# Patient Record
Sex: MALE | Race: WHITE | NOT HISPANIC OR LATINO | Employment: OTHER | URBAN - METROPOLITAN AREA
[De-identification: names, ages, dates, MRNs, and addresses within clinical notes are randomized per-mention and may not be internally consistent; named-entity substitution may affect disease eponyms.]

---

## 2017-05-18 ASSESSMENT — ENCOUNTER SYMPTOMS
LIGHT-HEADEDNESS: 1
BOWEL INCONTINENCE: 0
BLOATING: 1
BLOOD IN STOOL: 0
CLAUDICATION: 1
CONSTIPATION: 0
HOARSE VOICE: 0
BACK PAIN: 1
HEMATURIA: 0
MUSCLE WEAKNESS: 0
DISTURBANCES IN COORDINATION: 0
EXERCISE INTOLERANCE: 0
STIFFNESS: 1
TACHYCARDIA: 0
HEARTBURN: 1
LOSS OF CONSCIOUSNESS: 0
SEIZURES: 0
WEAKNESS: 0
FATIGUE: 1
NAUSEA: 0
DIFFICULTY URINATING: 1
DYSURIA: 0
LEG PAIN: 1
TREMORS: 0
MEMORY LOSS: 0
TASTE DISTURBANCE: 0
DIARRHEA: 0
ALTERED TEMPERATURE REGULATION: 0
SYNCOPE: 0
ORTHOPNEA: 0
HEADACHES: 0
WEIGHT LOSS: 1
INCREASED ENERGY: 1
ABDOMINAL PAIN: 1
NUMBNESS: 1
VOMITING: 0
JAUNDICE: 0
HALLUCINATIONS: 0
SPEECH CHANGE: 0
POLYPHAGIA: 0
MUSCLE CRAMPS: 1
HYPERTENSION: 0
SMELL DISTURBANCE: 0
HYPOTENSION: 0
TROUBLE SWALLOWING: 0
NIGHT SWEATS: 0
PALPITATIONS: 0
SORE THROAT: 0
MYALGIAS: 1
POOR WOUND HEALING: 0
POLYDIPSIA: 0
NAIL CHANGES: 1
RECTAL PAIN: 0
JOINT SWELLING: 0
SLEEP DISTURBANCES DUE TO BREATHING: 0
CHILLS: 0
SKIN CHANGES: 0
DECREASED APPETITE: 0
SINUS PAIN: 1
DIZZINESS: 1
FEVER: 0
RECTAL BLEEDING: 0
TINGLING: 1
WEIGHT GAIN: 0
LEG SWELLING: 0
NECK MASS: 0
NECK PAIN: 1
FLANK PAIN: 0
PARALYSIS: 0
SINUS CONGESTION: 0
ARTHRALGIAS: 1

## 2017-05-19 ENCOUNTER — RECORDS - HEALTHEAST (OUTPATIENT)
Dept: LAB | Facility: CLINIC | Age: 67
End: 2017-05-19

## 2017-05-19 LAB
CHOLEST SERPL-MCNC: 148 MG/DL
FASTING STATUS PATIENT QL REPORTED: NO
HDLC SERPL-MCNC: 59 MG/DL
LDLC SERPL CALC-MCNC: 64 MG/DL
TRIGL SERPL-MCNC: 124 MG/DL

## 2017-05-22 ENCOUNTER — RECORDS - HEALTHEAST (OUTPATIENT)
Dept: ADMINISTRATIVE | Facility: OTHER | Age: 67
End: 2017-05-22

## 2017-05-22 ENCOUNTER — RESEARCH ENCOUNTER (OUTPATIENT)
Dept: NEUROLOGY | Facility: CLINIC | Age: 67
End: 2017-05-22

## 2017-05-22 ENCOUNTER — OFFICE VISIT (OUTPATIENT)
Dept: NEUROLOGY | Facility: CLINIC | Age: 67
End: 2017-05-22

## 2017-05-22 VITALS
SYSTOLIC BLOOD PRESSURE: 121 MMHG | BODY MASS INDEX: 23.51 KG/M2 | DIASTOLIC BLOOD PRESSURE: 72 MMHG | HEIGHT: 64 IN | HEART RATE: 76 BPM | WEIGHT: 137.7 LBS

## 2017-05-22 DIAGNOSIS — G71.19 PARAMYOTONIA CONGENITA: ICD-10-CM

## 2017-05-22 DIAGNOSIS — G71.11 MYOTONIC MUSCULAR DYSTROPHY (H): ICD-10-CM

## 2017-05-22 DIAGNOSIS — G25.81 RESTLESS LEGS SYNDROME (RLS): Primary | ICD-10-CM

## 2017-05-22 LAB
IRON SATN MFR SERPL: 43 % (ref 15–46)
IRON SERPL-MCNC: 118 UG/DL (ref 35–180)
TIBC SERPL-MCNC: 276 UG/DL (ref 240–430)

## 2017-05-22 RX ORDER — GABAPENTIN 300 MG/1
600 CAPSULE ORAL 3 TIMES DAILY
Qty: 540 CAPSULE | Refills: 4 | Status: SHIPPED | OUTPATIENT
Start: 2017-05-22 | End: 2018-06-18

## 2017-05-22 ASSESSMENT — PAIN SCALES - GENERAL: PAINLEVEL: NO PAIN (0)

## 2017-05-22 NOTE — PROGRESS NOTES
Kassy noe Tamera [facility] Neuromuscular Registry    IRB # [id number]  PI: Kashif Melendrez MD, PhD  : Jessica Ernst    Patient was approached for possible participation for the above study. The current approved IRB consent form was discussed and explained to the patient.  It was discussed that involvement with the study is voluntary and refusal to participate would not involve penalty or decrease benefits at which the patient is entitled, and the subject may discontinue his/her involvement at any time without penalty or loss in benefits. We also discussed that this study does not have follow up visits or procedures. Patient was informed that an additional contact might occur if data needed was not found in patient s medical record. The patient was given time to review and ask any questions about the consent. Patient was shown contact information for PI and study staff in consent for future questions. Patient verbalized understanding of consent and study by restating the purpose, procedures, duration, risk, confidentiality of PHI, and voluntarily participation. Patient printed, signed and dated the consent and HIPAA form prior to study involvement. A copy was given to the patient for their records.     Subject Consent/HIPAA : SIGNED ON 5.22.2017

## 2017-05-22 NOTE — PATIENT INSTRUCTIONS
If you have questions or concerns following today's appointment, please contact   Maddy Olivares at [phone].    For more urgent concerns, contact the neurology department triage line at [phone] option 3.    Follow up with Dr. Decker in 1 year.  Labs today.  Medication: Gabapentin refilled for 1 year today.     We now have a  available to help patients with psychosocial needs, supportive counseling, advanced care planning, and insurance and/or disability questions. You can reach TRAVON Guzman, LICSW at [phone].

## 2017-05-22 NOTE — MR AVS SNAPSHOT
After Visit Summary   5/22/2017    Vasquez Ann    MRN: [id number]           Patient Information     Date Of Birth          1950        Visit Information        Provider Department      5/22/2017 9:20 AM Glenn Decker MD [facility] Neurology        Today's Diagnoses     Restless legs syndrome (RLS)    -  1      Care Instructions    If you have questions or concerns following today's appointment, please contact   Maddy Olivares at [phone].    For more urgent concerns, contact the neurology department triage line at [phone] option 3.    Follow up with Dr. Decker in 1 year.  Labs today.  Medication: Gabapentin refilled for 1 year today.     We now have a  available to help patients with psychosocial needs, supportive counseling, advanced care planning, and insurance and/or disability questions. You can reach TRAVON Guzman, [facility] at [phone].            Follow-ups after your visit        Your next 10 appointments already scheduled     May 22, 2017 10:00 AM CDT   LAB with [facility])    [address] [place] [address] [phone]           Patient must bring picture ID.  Patient should be prepared to give a urine specimen  Please do not eat 10-12 hours before your appointment if you are coming in fasting for labs on lipids, cholesterol, or glucose (sugar).  Pregnant women should follow their Care Team instructions. Water with medications is okay. Do not drink coffee or other fluids.   If you have concerns about taking  your medications, please ask at office or if scheduling via [facility], send a message by clicking on Secure Messaging, Message Your Care Team.            May 14, 2018  8:50 AM CDT   (Arrive by 8:35 AM)   Return Muscular Dystrophy with Glenn Decker MD   [facility] Neurology ([facility])    [address] [place] [address]  "[facility] [address] [phone]              Who to contact     Please call your clinic at [phone] to:    Ask questions about your health    Make or cancel appointments    Discuss your medicines    Learn about your test results    Speak to your doctor   If you have compliments or concerns about an experience at your clinic, or if you wish to file a complaint, please contact [facility] Patient Relations at [phone] or email us at [email or web address]         Additional Information About Your Visit        Hotelbarhart Information     [employer] gives you secure access to your electronic health record. If you see a primary care provider, you can also send messages to your care team and make appointments. If you have questions, please call your primary care clinic.  If you do not have a primary care provider, please call [phone] and they will assist you.      [employer] is an electronic gateway that provides easy, online access to your medical records. With [employer], you can request a clinic appointment, read your test results, renew a prescription or communicate with your care team.     To access your existing account, please contact your [facility] or call [phone] for assistance.        Care EveryWhere ID     This is your Care EveryWhere ID. This could be used by other organizations to access your [place] medical records  [id number]        Your Vitals Were     Pulse Height BMI (Body Mass Index)             76 1.626 m (5' 4\") 23.64 kg/m2          Blood Pressure from Last 3 Encounters:   05/22/17 121/72   11/09/15 118/82   11/10/14 121/71    Weight from Last 3 Encounters:   05/22/17 62.5 kg (137 lb 11.2 oz)   11/10/14 66.7 kg (147 lb)   09/12/14 66.7 kg (147 lb)              We Performed the Following     Iron TIBC Ferritin Panel ([facility])          Today's Medication Changes          These changes are accurate as of: 5/22/17  " 9:43 AM.  If you have any questions, ask your nurse or doctor.               These medicines have changed or have updated prescriptions.        Dose/Directions    atorvastatin 20 MG tablet   Commonly known as:  LIPITOR   This may have changed:    - when to take this  - reasons to take this   Used for:  Hyperlipidemia LDL goal < 100        Dose:  20 mg   Take 1 tablet (20 mg) by mouth daily   Quantity:  90 tablet   Refills:  4       omeprazole 20 MG CR capsule   Commonly known as:  priLOSEC   This may have changed:  how much to take   Used for:  GERD (gastroesophageal reflux disease)        Dose:  20 mg   Take 1 capsule (20 mg) by mouth 2 times daily   Quantity:  180 capsule   Refills:  4            Where to get your medicines      These medications were sent to [id number] - [place], [place] [address] [facility] [address]  [facility] [address], [place] [address]     Phone:  [phone]     gabapentin 300 MG capsule                Primary Care Provider Office Phone # Fax #    Juan José Johns [phone]       [facility] [address] [facility] [address]  [facility] [address]        Thank you!     Thank you for choosing [facility] NEUROLOGY  for your care. Our goal is always to provide you with excellent care. Hearing back from our patients is one way we can continue to improve our services. Please take a few minutes to complete the written survey that you may receive in the mail after your visit with us. Thank you!             Your Updated Medication List - Protect others around you: Learn how to safely use, store and throw away your medicines at www.disposemymeds.org.          This list is accurate as of: 5/22/17  9:43 AM.  Always use your most recent med list.                   Brand Name Dispense Instructions for use    aspirin 81 MG tablet      Take 1 tablet by mouth daily.       atorvastatin 20 MG tablet    LIPITOR    90 tablet    Take 1  tablet (20 mg) by mouth daily       clonazePAM 0.5 MG tablet    klonoPIN     Take 0.5 mg by mouth as needed 3 tablets daily (1.5 mg)       CYMBALTA PO      Take 60 mg by mouth daily       gabapentin 300 MG capsule    NEURONTIN    540 capsule    Take 2 capsules (600 mg) by mouth 3 times daily       omeprazole 20 MG CR capsule    priLOSEC    180 capsule    Take 1 capsule (20 mg) by mouth 2 times daily

## 2017-05-22 NOTE — LETTER
2017       RE: aVsquez Ann  [address]    [facility] [address]     Dear Colleague,    Thank you for referring your patient, Vasquez Ann, to the [facility] NEUROLOGY at [facility] Please see a copy of my visit note below.    May 22, 2017      Juan José Johns MD   [facility]    [address]   [place] [address]      RE: Vasquez Ann   MRN: [id number]   : 1950      Dear Dr. Johns:   I had the pleasure to see Vasquez in followup at the [facility] today.  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  His chief complaint is abdominal pain and lower extremity stiffness or cramps.  He had a repeat spigelian hernia surgery a few months ago that was painful and recovery was more prolonged than usual.  He has been extensively evaluated by GI several times and the pain is not felt to be due to a gastroenterological problem.  I felt that this could be attributed to muscle spasm of the abdominal wall possibly related to myotonia.  He takes gabapentin 600 mg t.i.d. which he finds helpful.  He is also on Cymbalta 60 mg daily.  He describes to me some restlessness of his legs in the evenings. He has to move when he sits on a chair and when he gets up and goes, the stiffness gets better.  The same symptoms are reported by both of his daughters.      CURRENT MEDICATIONS:  Reviewed and are as per Epic record.   PHYSICAL EXAMINATION:     VITAL SIGNS:   His blood pressure 121/72.  Pulse 76 and regular.  He weighs 62.5 kg.  Height is 163.  He endorses no pain. Neurologic examination demonstrates no weakness of the proximal or distal muscles in upper or lower extremities, and no weakness in neck flexion or extension.  I did not detect hand  myotonia or myotonia upon percussion of the thenar eminence or EDC today.  He does not have any eyelid myotonia or lid  lag, either.      In summary, Mr. Ann has paramyotonia congenita with an SCN4A mutation but some of the symptomatology he describes to me sound like restless legs syndrome and so do his daughters' complaints.  For that reason, I will check an iron panel.  If this is abnormal, this should be investigated by his primary care doctor with appropriate endoscopic evaluation for GI blood loss. He is satisfied with gabapentin 600 mg t.i.d., and I am going to renew it.  He will return to our clinic for followup in 1 year. TT spent for patient care 15 minutes; more than half was counseling.      Sincerely,      Glenn Decker MD

## 2017-05-22 NOTE — PROGRESS NOTES
May 22, 2017      Juan José Johns MD   [facility]    [address]   [place] [address]      RE: Vasquez Ann   MRN: [id number]   : 1950      Dear Dr. Machado:      I had the pleasure to see Vasquez in followup at the [facility] today.  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  His chief complaint is abdominal pain and lower extremity stiffness or cramps.  He had a repeat spigelian hernia surgery a few months ago that was painful and recovery was more prolonged than usual.  He has been extensively evaluated by GI several times and the pain is not felt to be due to a gastroenterological problem.  I felt that this could be attributed to muscle spasm of the abdominal wall possibly related to myotonia.  He takes gabapentin 600 mg t.i.d. which he finds helpful.  He is also on Cymbalta 60 mg daily.  He describes to me some restlessness of his legs in the evenings. He has to move when he sits on a chair and when he gets up and goes, the stiffness gets better.  The same symptoms are reported by both of his daughters.      CURRENT MEDICATIONS:  Reviewed and are as per Epic record.      PHYSICAL EXAMINATION:     VITAL SIGNS:   His blood pressure 121/72.  Pulse 76 and regular.  He weighs 62.5 kg.  Height is 163.  He endorses no pain. Neurologic examination demonstrates no weakness of the proximal or distal muscles in upper or lower extremities, and no weakness in neck flexion or extension.  I did not detect hand  myotonia or myotonia upon percussion of the thenar eminence or EDC today.  He does not have any eyelid myotonia or lid lag, either.      In summary, Mr. Ann has paramyotonia congenita with an SCN4A mutation but some of the symptomatology he describes to me sound like restless legs syndrome and so do his daughters' complaints.  For that reason, I will check an iron panel.  If this is abnormal, this should  be investigated by his primary care doctor with appropriate endoscopic evaluation for GI blood loss. He is satisfied with gabapentin 600 mg t.i.d., and I am going to renew it.  He will return to our clinic for followup in 1 year. TT spent for patient care 15 minutes; more than half was counseling.      Sincerely,       MD THAD Li MD             D: 2017 09:18   T: 2017 11:27   MT: SHANICE      Name:     LANG OLIVAS   MRN:      [id number]        Account:      [id number]   :      1950           Service Date: 2017      Document: [id number]

## 2017-05-23 NOTE — PROGRESS NOTES
Answers for HPI/ROS submitted by the patient on 5/18/2017   General Symptoms: Yes  Skin Symptoms: Yes  HENT Symptoms: Yes  EYE SYMPTOMS: No  HEART SYMPTOMS: Yes  LUNG SYMPTOMS: No  INTESTINAL SYMPTOMS: Yes  URINARY SYMPTOMS: Yes  REPRODUCTIVE SYMPTOMS: Yes  SKELETAL SYMPTOMS: Yes  BLOOD SYMPTOMS: No  NERVOUS SYSTEM SYMPTOMS: Yes  MENTAL HEALTH SYMPTOMS: No  Fever: No  Loss of appetite: No  Weight loss: Yes  Weight gain: No  Fatigue: Yes  Night sweats: No  Chills: No  Increased stress: No  Excessive hunger: No  Excessive thirst: No  Feeling hot or cold when others believe the temperature is normal: No  Loss of height: No  Post-operative complications: No  Surgical site pain: Yes  Hallucinations: No  Change in or Loss of Energy: Yes  Hyperactivity: No  Confusion: No  Changes in hair: No  Changes in moles/birth marks: No  Itching: No  Rashes: No  Changes in nails: Yes  Acne: No  Change in facial hair: No  Warts: No  Non-healing sores: No  Scarring: No  Flaking of skin: No  Color changes of hands/feet in cold : No  Sun sensitivity: No  Skin thickening: No  Ear pain: No  Ear discharge: No  Hearing loss: No  Tinnitus: No  Nosebleeds: Yes  Congestion: No  Sinus pain: Yes  Trouble swallowing: No   Voice hoarseness: No  Mouth sores: No  Sore throat: No  Tooth pain: No  Gum tenderness: No  Bleeding gums: No  Change in taste: No  Change in sense of smell: No  Dry mouth: Yes  Hearing aid used: No  Neck lump: No  Chest pain or pressure: No  Fast or irregular heartbeat: No  Pain in legs with walking: Yes  Swelling in feet or ankles: No  Trouble breathing while lying down: No  Fingers or Toes appear blue: No  High blood pressure: No  Low blood pressure: No  Fainting: No  Murmurs: No  Chest pain on exertion: No  Chest pain at rest: No  Cramping pain in leg during exercise: Yes  Pacemaker: No  Varicose veins: No  Edema or swelling: No  Fast heart beat: No  Wake up at night with shortness of breath: No  Heart flutters:  No  Light-headedness: Yes  Exercise intolerance: No  Heart burn or indigestion: Yes  Nausea: No  Vomiting: No  Abdominal pain: Yes  Bloating: Yes  Constipation: No  Diarrhea: No  Blood in stool: No  Black stools: No  Rectal or Anal pain: No  Fecal incontinence: No  Rectal bleeding: No  Yellowing of skin or eyes: No  Vomit with blood: No  Change in stools: No  Hemorrhoids: No  Trouble holding urine or incontinence: No  Pain or burning: No  Trouble starting or stopping: No  Increased frequency of urination: No  Blood in urine: No  Decreased frequency of urination: Yes  Frequent nighttime urination: No  Flank pain: No  Difficulty emptying bladder: Yes  Back pain: Yes  Muscle aches: Yes  Neck pain: Yes  Swollen joints: No  Joint pain: Yes  Bone pain: No  Muscle cramps: Yes  Muscle weakness: No  Joint stiffness: Yes  Bone fracture: No  Trouble with coordination: No  Dizziness or trouble with balance: Yes  Fainting or black-out spells: No  Memory loss: No  Headache: No  Seizures: No  Speech problems: No  Tingling: Yes  Tremor: No  Weakness: No  Difficulty walking: No  Paralysis: No  Numbness: Yes  Scrotal pain or swelling: No  Erectile dysfunction: Yes  Penile discharge: No  Genital ulcers: No  Reduced libido: Yes

## 2018-06-17 ASSESSMENT — ENCOUNTER SYMPTOMS
BACK PAIN: 1
VOMITING: 0
COUGH: 0
WHEEZING: 0
DIARRHEA: 0
BLOATING: 0
HEMOPTYSIS: 0
SPUTUM PRODUCTION: 1
BLOOD IN STOOL: 0
MUSCLE CRAMPS: 1
POSTURAL DYSPNEA: 0
ABDOMINAL PAIN: 0
BOWEL INCONTINENCE: 0
JAUNDICE: 0
JOINT SWELLING: 0
MUSCLE WEAKNESS: 0
RECTAL PAIN: 0
ARTHRALGIAS: 1
SNORES LOUDLY: 1
SHORTNESS OF BREATH: 1
DYSPNEA ON EXERTION: 1
CONSTIPATION: 0
NAUSEA: 0
NECK PAIN: 0
MYALGIAS: 1
HEARTBURN: 1
COUGH DISTURBING SLEEP: 0
STIFFNESS: 1

## 2018-06-18 ENCOUNTER — OFFICE VISIT (OUTPATIENT)
Dept: NEUROLOGY | Facility: CLINIC | Age: 68
End: 2018-06-18
Payer: COMMERCIAL

## 2018-06-18 VITALS
HEART RATE: 94 BPM | SYSTOLIC BLOOD PRESSURE: 125 MMHG | BODY MASS INDEX: 25.08 KG/M2 | RESPIRATION RATE: 24 BRPM | OXYGEN SATURATION: 99 % | DIASTOLIC BLOOD PRESSURE: 81 MMHG | WEIGHT: 146.9 LBS | TEMPERATURE: 98.2 F | HEIGHT: 64 IN

## 2018-06-18 DIAGNOSIS — G71.19 PARAMYOTONIA CONGENITA: ICD-10-CM

## 2018-06-18 DIAGNOSIS — M62.89 MYOTONIA: Primary | Chronic | ICD-10-CM

## 2018-06-18 DIAGNOSIS — R06.02 EXERTIONAL SHORTNESS OF BREATH: Primary | ICD-10-CM

## 2018-06-18 PROBLEM — Z87.19 S/P REPAIR OF VENTRAL HERNIA: Status: ACTIVE | Noted: 2017-01-03

## 2018-06-18 PROBLEM — Z86.73 H/O TIA (TRANSIENT ISCHEMIC ATTACK) AND STROKE: Status: ACTIVE | Noted: 2017-01-03

## 2018-06-18 PROBLEM — R10.9 ABDOMINAL PAIN: Status: ACTIVE | Noted: 2017-01-03

## 2018-06-18 PROBLEM — Z98.890 S/P REPAIR OF VENTRAL HERNIA: Status: ACTIVE | Noted: 2017-01-03

## 2018-06-18 RX ORDER — UBIDECARENONE 100 MG
100 CAPSULE ORAL DAILY
COMMUNITY

## 2018-06-18 RX ORDER — GABAPENTIN 300 MG/1
CAPSULE ORAL
Qty: 630 CAPSULE | Refills: 2 | Status: SHIPPED | OUTPATIENT
Start: 2018-06-18 | End: 2022-05-05

## 2018-06-18 ASSESSMENT — PAIN SCALES - GENERAL: PAINLEVEL: MILD PAIN (2)

## 2018-06-18 NOTE — MR AVS SNAPSHOT
After Visit Summary   6/18/2018    Vasquez Ann    MRN: [id number]           Patient Information     Date Of Birth          1950        Visit Information        Provider Department      6/18/2018 9:20 AM Glenn Decker MD [facility] Neurology        Today's Diagnoses     Exertional shortness of breath    -  1    Paramyotonia congenita           Follow-ups after your visit        Follow-up notes from your care team     Return in about 1 year (around 6/18/2019).      Your next 10 appointments already scheduled     Jun 18, 2018 11:30 AM CDT   PFT VISIT with  PFL VICTOR HUGO   [facility] Pulmonary Function Testing ([facility])    [address] [place] [address] [phone]            Jun 03, 2019 10:50 AM CDT   (Arrive by 10:35 AM)   Return Muscular Dystrophy with Glenn Decker MD   [facility] Neurology ([facility])    [address] [place] [address] [phone]              Future tests that were ordered for you today     Open Future Orders        Priority Expected Expires Ordered    PFT General Lab Testing Routine  6/18/2019 6/18/2018            Who to contact     Please call your clinic at [phone] to:    Ask questions about your health    Make or cancel appointments    Discuss your medicines    Learn about your test results    Speak to your doctor            Additional Information About Your Visit        MyChart Information     [employer] gives you secure access to your electronic health record. If you see a primary care provider, you can also send messages to your care team and make appointments. If you have questions, please call your primary care clinic.  If you do not have a primary care provider, please call [phone] and they will assist you.      [employer] is an electronic gateway that provides easy, online access to your medical records. With [employer],  "you can request a clinic appointment, read your test results, renew a prescription or communicate with your care team.     To access your existing account, please contact your [facility] or call [phone] for assistance.        Care EveryWhere ID     This is your Care EveryWhere ID. This could be used by other organizations to access your [place] medical records  [id number]        Your Vitals Were     Pulse Temperature Respirations Height Pulse Oximetry BMI (Body Mass Index)    94 98.2  F (36.8  C) (Oral) 24 1.613 m (5' 3.5\") 99% 25.61 kg/m2       Blood Pressure from Last 3 Encounters:   06/18/18 125/81   05/22/17 121/72   11/09/15 118/82    Weight from Last 3 Encounters:   06/18/18 66.6 kg (146 lb 14.4 oz)   05/22/17 62.5 kg (137 lb 11.2 oz)   11/10/14 66.7 kg (147 lb)                 Today's Medication Changes          These changes are accurate as of 6/18/18  9:33 AM.  If you have any questions, ask your nurse or doctor.               These medicines have changed or have updated prescriptions.        Dose/Directions    atorvastatin 20 MG tablet   Commonly known as:  LIPITOR   This may have changed:    - when to take this  - reasons to take this   Used for:  Hyperlipidemia LDL goal < 100        Dose:  20 mg   Take 1 tablet (20 mg) by mouth daily   Quantity:  90 tablet   Refills:  4       gabapentin 300 MG capsule   Commonly known as:  NEURONTIN   This may have changed:    - how much to take  - how to take this  - when to take this  - additional instructions   Changed by:  Glenn Decker MD        Take 2 capsules in the morning and noon, and 3 in the evening   Quantity:  630 capsule   Refills:  2       omeprazole 20 MG CR capsule   Commonly known as:  priLOSEC   This may have changed:  how much to take   Used for:  GERD (gastroesophageal reflux disease)        Dose:  20 mg   Take 1 capsule (20 mg) by mouth 2 times daily   Quantity:  180 capsule   Refills:  4    "         Where to get your medicines      These medications were sent to [id number] - [place], [place] - [address] AT [facility] [place] [address]  [address], [place] [address]     Phone:  [phone]     gabapentin 300 MG capsule                Primary Care Provider Office Phone # Fax #    Enrrique Reeves [phone]       [facility] [address]  [place] [address]        Equal Access to Services     RENETTA CLARK : Hadii aad ku hadasho Soomaali, waaxda luqadaha, qaybta kaalmada adeegyada, waxay idiin hayaan adeeg kharahailee encarnacion . So [facility] [phone].    ATENCIÓN: Si habla español, tiene a flor disposición servicios gratuitos de asistencia lingüística. [facility] [phone].    We comply with applicable federal civil rights laws and [place] laws. We do not discriminate on the basis of race, color, national origin, age, disability, sex, sexual orientation, or gender identity.            Thank you!     Thank you for choosing [facility] NEUROLOGY  for your care. Our goal is always to provide you with excellent care. Hearing back from our patients is one way we can continue to improve our services. Please take a few minutes to complete the written survey that you may receive in the mail after your visit with us. Thank you!             Your Updated Medication List - Protect others around you: Learn how to safely use, store and throw away your medicines at www.disposemymeds.org.          This list is accurate as of 6/18/18  9:33 AM.  Always use your most recent med list.                   Brand Name Dispense Instructions for use Diagnosis    aspirin 81 MG tablet      Take 1 tablet by mouth daily.        atorvastatin 20 MG tablet    LIPITOR    90 tablet    Take 1 tablet (20 mg) by mouth daily    Hyperlipidemia LDL goal < 100       co-enzyme Q-10 100 MG Caps capsule      Take 100 mg by mouth daily        CYMBALTA PO      Take 60 mg by mouth daily         gabapentin 300 MG capsule    NEURONTIN    630 capsule    Take 2 capsules in the morning and noon, and 3 in the evening        IRON PO      Take 1 tablet by mouth daily        omeprazole 20 MG CR capsule    priLOSEC    180 capsule    Take 1 capsule (20 mg) by mouth 2 times daily    GERD (gastroesophageal reflux disease)       VITAMIN D-1000 MAX ST 1000 units Tabs   Generic drug:  cholecalciferol      Take 1,000 Units by mouth daily

## 2018-06-18 NOTE — PROGRESS NOTES
Service Date: 2018             Enrrique Reeves MD    [address] [place] [address]      RE:    Vasquez Ann   MRN:  [id number]   :  1950      Dear Dr. Reeves:      I had the pleasure to see Vasquez in followup at the 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 His chief complaint is mild diffuse myalgia, at times abdominal pain and lower extremity stiffness or cramps.  He reports satisfactory control of his symptoms.  He is taking his gabapentin 600 mg 3 times a day.  He is describing some symptoms suggestive of restless legs syndrome in the evening with excessive urge to move his legs, which gabapentin partially helps.  He had iron studies done last year including TIBC and saturation which were normal.  He reports new shortness of breath with exertion, particularly when ascending stairs or going up hills and he wonders whether this is due to aging/deconditioning.        Medications were reviewed and are as per Epic record.      PHYSICAL EXAMINATION:   VITAL SIGNS:  His blood pressure is 125/81, pulse 94 and regular.  Respiratory rate 24.  Temperature 98.2 Fahrenheit oral.  O2 sat 99% on room air, weight 66.6 kg.  Height is 161 cm and he endorses mild pain 2/10 of the lower leg.     NEUROLOGIC EXAMINATION:  He has no myotonia of eyelids, thenar, EDC percussion or handgrip.  He has 5/5 strength in proximal and distal muscle groups of upper and lower extremities.  He is able to rise from a chair with arms crossed on the chest 2 times without difficulties.      In summary, Mr. Ann has paramyotonia congenita with an SCN4A mutation that is stable.  He may also have mild restless leg syndrome.  I asked him to take gabapentin 600 mg in the morning and noon.  He can increase the evening dose to 900 mg as needed.  I will check spirometry to assess his complaint of shortness of breath on  exertion. I think it most likely reflects deconditioning.  Respiratory muscle involvement is exceedingly rare with paramyotonia.        I will see him in followup in 1 year or earlier if necessary. TT spent for patient care 15 minutes; more than half was counseling.            Sincerely,         MD THAD Lopez MD             D: 2018   T: 2018   MT:       Name:     LANG OLIVAS   MRN:      [id number]        Account:      [id number]   :      1950           Service Date: 2018      Document: [id number]      Answers for HPI/ROS submitted by the patient on 2018   General Symptoms: No  Skin Symptoms: No  HENT Symptoms: No  EYE SYMPTOMS: No  HEART SYMPTOMS: No  LUNG SYMPTOMS: Yes  INTESTINAL SYMPTOMS: Yes  URINARY SYMPTOMS: No  REPRODUCTIVE SYMPTOMS: No  SKELETAL SYMPTOMS: Yes  BLOOD SYMPTOMS: No  NERVOUS SYSTEM SYMPTOMS: No  MENTAL HEALTH SYMPTOMS: No  Cough: No  Sputum or phlegm: Yes  Coughing up blood: No  Difficulty breating or shortness of breath: Yes  Snoring: Yes  Wheezing: No  Difficulty breathing on exertion: Yes  Nighttime Cough: No  Difficulty breathing when lying flat: No  Heart burn or indigestion: Yes  Nausea: No  Vomiting: No  Abdominal pain: No  Bloating: No  Constipation: No  Diarrhea: No  Blood in stool: No  Black stools: No  Rectal or Anal pain: No  Fecal incontinence: No  Yellowing of skin or eyes: No  Vomit with blood: No  Change in stools: No  Back pain: Yes  Muscle aches: Yes  Neck pain: No  Swollen joints: No  Joint pain: Yes  Bone pain: Yes  Muscle cramps: Yes  Muscle weakness: No  Joint stiffness: Yes  Bone fracture: No

## 2018-06-18 NOTE — NURSING NOTE
Chief Complaint   Patient presents with     RECHECK     [facility] F/U     Vj Nance, CMA

## 2018-06-18 NOTE — LETTER
2018       RE: Vasquez Ann  [address]  [address]  [facility] [address]     Dear Colleague,    Thank you for referring your patient, Vasquez Ann, to the [facility] NEUROLOGY at [facility]. Please see a copy of my visit note below.    Service Date: 2018             Enrrique Reeves MD    [facility]   [address] [place] [address]      RE:    Vasquez Ann   MRN:  [id number]   :  1950      Dear Dr. 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 His chief complaint is mild diffuse myalgia, at times abdominal pain and lower extremity stiffness or cramps.  He reports satisfactory control of his symptoms.  He is taking his gabapentin 600 mg 3 times a day.  He is describing some symptoms suggestive of restless legs syndrome in the evening with excessive urge to move his legs, which gabapentin partially helps.  He had iron studies done last year including TIBC and saturation which were normal.  He reports new shortness of breath with exertion, particularly when ascending stairs or going up hills and he wonders whether this is due to aging/deconditioning.        Medications were reviewed and are as per Epic record.      PHYSICAL EXAMINATION:   VITAL SIGNS:  His blood pressure is 125/81, pulse 94 and regular.  Respiratory rate 24.  Temperature 98.2 Fahrenheit oral.  O2 sat 99% on room air, weight 66.6 kg.  Height is 161 cm and he endorses mild pain 2/10 of the lower leg.     NEUROLOGIC EXAMINATION:  He has no myotonia of eyelids, thenar, EDC percussion or handgrip.  He has 5/5 strength in proximal and distal muscle groups of upper and lower extremities.  He is able to rise from a chair with arms crossed on the chest 2 times without difficulties.      In summary, Mr. Ann has  paramyotonia congenita with an SCN4A mutation that is stable.  He may also have mild restless leg syndrome.  I asked him to take gabapentin 600 mg in the morning and noon.  He can increase the evening dose to 900 mg as needed.  I will check spirometry to assess his complaint of shortness of breath on exertion. I think it most likely reflects deconditioning.  Respiratory muscle involvement is exceedingly rare with paramyotonia.        I will see him in followup in 1 year or earlier if necessary. TT spent for patient care 15 minutes; more than half was counseling.            Sincerely,         MD THAD Lopez MD             D: 2018   T: 2018   MT: MARIYA      Name:     LANG OLIVAS   MRN:      [id number]        Account:      [id number]   :      1950           Service Date: 2018      Document: [id number]

## 2018-07-18 ENCOUNTER — RECORDS - HEALTHEAST (OUTPATIENT)
Dept: LAB | Facility: CLINIC | Age: 68
End: 2018-07-18

## 2018-07-18 LAB
CHOLEST SERPL-MCNC: 152 MG/DL
EXPTIME-PRE: 7.16 SEC
FASTING STATUS PATIENT QL REPORTED: NO
FEF2575-%PRED-PRE: 181 %
FEF2575-PRE: 3.84 L/SEC
FEF2575-PRED: 2.12 L/SEC
FEFMAX-%PRED-PRE: 106 %
FEFMAX-PRE: 7.82 L/SEC
FEFMAX-PRED: 7.36 L/SEC
FEV1-%PRED-PRE: 119 %
FEV1-PRE: 3.03 L
FEV1FEV6-PRE: 84 %
FEV1FEV6-PRED: 78 %
FEV1FVC-PRE: 83 %
FEV1FVC-PRED: 74 %
FIFMAX-PRE: 4.91 L/SEC
FVC-%PRED-PRE: 112 %
FVC-PRE: 3.63 L
FVC-PRED: 3.24 L
HDLC SERPL-MCNC: 52 MG/DL
LDLC SERPL CALC-MCNC: 75 MG/DL
MEP-PRE: 130 CMH2O
MIP-PRE: -80 CMH2O
TRIGL SERPL-MCNC: 125 MG/DL

## 2019-05-29 ASSESSMENT — ENCOUNTER SYMPTOMS
NECK PAIN: 0
MYALGIAS: 1
JOINT SWELLING: 0
STIFFNESS: 1
BACK PAIN: 1
MUSCLE WEAKNESS: 0
ARTHRALGIAS: 1
MUSCLE CRAMPS: 0

## 2019-06-03 ENCOUNTER — OFFICE VISIT (OUTPATIENT)
Dept: NEUROLOGY | Facility: CLINIC | Age: 69
End: 2019-06-03
Payer: COMMERCIAL

## 2019-06-03 VITALS
DIASTOLIC BLOOD PRESSURE: 85 MMHG | BODY MASS INDEX: 25.27 KG/M2 | OXYGEN SATURATION: 95 % | WEIGHT: 144.9 LBS | HEART RATE: 83 BPM | SYSTOLIC BLOOD PRESSURE: 133 MMHG

## 2019-06-03 DIAGNOSIS — G71.00 MUSCULAR DYSTROPHY (H): ICD-10-CM

## 2019-06-03 DIAGNOSIS — G71.19 PARAMYOTONIA CONGENITA: Primary | ICD-10-CM

## 2019-06-03 DIAGNOSIS — G71.00 MUSCULAR DYSTROPHY (H): Primary | ICD-10-CM

## 2019-06-03 ASSESSMENT — PAIN SCALES - GENERAL: PAINLEVEL: SEVERE PAIN (7)

## 2019-06-03 NOTE — PROGRESS NOTES
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 Chief complaint in the past has been diffuse myalgias and at times lower extremity stiffness or cramps. He also has symptoms consistent with restless leg syndrome.     INTERVAL HISTORY: Mr. Ann reports that over the interval his symptoms have been stable. He was previously on gabapenting but stopped this many months ago because he did not feel he was getting much benefit from it. He gets stiffness in his legs and his hands (specifically middle finger) mostly. This happens most mornings but improves with \"warming up\" the muscles. He also states that the cold weather worsens these cramps. He was seen by a orthopedic surgeon who gave him a trigger finger injection in the right hand because of pain last August, which he states helped his symptoms.     Vital signs:                         Estimated body mass index is 25.61 kg/m  as calculated from the following:    Height as of 18: 1.613 m (5' 3.5\").    Weight as of 18: 66.6 kg (146 lb 14.4 oz).    Examination:  General: NAD  HEENT: sclera anicteric  Resp: breathing comfortably on room air  Skin: warm and dry  Extremities: no edema    Neuro:  Mental Status: Fully alert, attentive and oriented. Speech fluent without errors.   Cranial Nerves: EOMI with normal smooth pursuit. Facial movements symmetric, obicularis oris and oculi 5/5. Hearing intact to conversation. Palate elevation symmetric, uvula midline. No dysarthria. Shoulder shrug strong bilaterally. Tongue protrusion midline.  Motor: Myotonia of the right third digit with repeated flexion, self-resolved after a few seconds. "      Deltoid Biceps Triceps Wrist Ext FDI    Right 5 5 5 5 5 5   Left 5 5 5 5 5 5    Hip flexors Knee Ext Knee Flex Dorsiflex Plantarflex    Right 5 5 5 5 5    Left 5 5 5 5 5      Reflexes: 2+ and symmetric at patella, biceps, brachioradialis.  Sensory: Intact/symmetric to light touch throughout upper and lower extremities.   Coordination: FNF without ataxia or dysmetria.  Station/Gait: Normal casual gait with good arm swing.      IINVESTIGATIONS:  All available and relevant labs, imaging, and other procedures were reviewed personally.     IMPRESSION/RECOMMENDATIONS:     #Paratonia congenita:  Mr. 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 He is clinically stable and is doing quite well. He is not currently taking any medications as he felt gabapentin was not helping that much. If his symptoms are not bothersome then no medications needed, he was advised to call the clinic if his symptoms worsen or he wishes to start a medication. Otherwise given his clinical stability plan to see him back in one year.     RETURN TO CLINIC: 12 months    Patient care discussed with attending neurologist, Dr. Decker, who agrees with my assessment and plan.    Chris Aguila MD  Neurology PGY3    ATTENDING ADDENDUM: Patient seen and examined today with resident Dr Aguila at the [facility]. Agree with his impression and recommendations as above. TT spent for patient care 15 minutes; more than half was counseling. Glenn Decker MD        Answers for HPI/ROS submitted by the patient on 5/29/2019   General Symptoms: No  Skin Symptoms: No  HENT Symptoms: No  EYE SYMPTOMS: No  HEART SYMPTOMS: No  LUNG SYMPTOMS: No  INTESTINAL SYMPTOMS: No  URINARY SYMPTOMS: No  REPRODUCTIVE SYMPTOMS: No  SKELETAL SYMPTOMS: Yes  BLOOD SYMPTOMS: No  NERVOUS SYSTEM SYMPTOMS: No  MENTAL HEALTH SYMPTOMS: No  Back pain: Yes  Muscle aches: Yes  Neck pain: No  Swollen joints:  No  Joint pain: Yes  Bone pain: No  Muscle cramps: No  Muscle weakness: No  Joint stiffness: Yes  Bone fracture: No

## 2019-06-03 NOTE — LETTER
"6/3/2019       RE: Vasquez Ann  [address]  [address]  [place] [address]     Dear Colleague,    Thank you for referring your patient, Vasquez Ann, to the [facility] NEUROLOGY at [facility]. Please see a copy of my visit note below.    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 Chief complaint in the past has been diffuse myalgias and at times lower extremity stiffness or cramps. He also has symptoms consistent with restless leg syndrome.     INTERVAL HISTORY: Mr. Ann reports that over the interval his symptoms have been stable. He was previously on gabapenting but stopped this many months ago because he did not feel he was getting much benefit from it. He gets stiffness in his legs and his hands (specifically middle finger) mostly. This happens most mornings but improves with \"warming up\" the muscles. He also states that the cold weather worsens these cramps. He was seen by a orthopedic surgeon who gave him a trigger finger injection in the right hand because of pain last August, which he states helped his symptoms.     Vital signs:                         Estimated body mass index is 25.61 kg/m  as calculated from the following:    Height as of 18: 1.613 m (5' 3.5\").    Weight as of 18: 66.6 kg (146 lb 14.4 oz).    Examination:  General: NAD  HEENT: sclera anicteric  Resp: breathing comfortably on room air  Skin: warm and dry  Extremities: no edema    Neuro:  Mental Status: Fully alert, attentive and oriented. Speech fluent without errors.   Cranial Nerves: EOMI with normal smooth pursuit. Facial movements " symmetric, obicularis oris and oculi 5/5. Hearing intact to conversation. Palate elevation symmetric, uvula midline. No dysarthria. Shoulder shrug strong bilaterally. Tongue protrusion midline.  Motor: Myotonia of the right third digit with repeated flexion, self-resolved after a few seconds.      Deltoid Biceps Triceps Wrist Ext FDI    Right 5 5 5 5 5 5   Left 5 5 5 5 5 5    Hip flexors Knee Ext Knee Flex Dorsiflex Plantarflex    Right 5 5 5 5 5    Left 5 5 5 5 5      Reflexes: 2+ and symmetric at patella, biceps, brachioradialis.  Sensory: Intact/symmetric to light touch throughout upper and lower extremities.   Coordination: FNF without ataxia or dysmetria.  Station/Gait: Normal casual gait with good arm swing.      IINVESTIGATIONS:  All available and relevant labs, imaging, and other procedures were reviewed personally.     IMPRESSION/RECOMMENDATIONS:     #Paratonia congenita:  Mr. 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 He is clinically stable and is doing quite well. He is not currently taking any medications as he felt gabapentin was not helping that much. If his symptoms are not bothersome then no medications needed, he was advised to call the clinic if his symptoms worsen or he wishes to start a medication. Otherwise given his clinical stability plan to see him back in one year.     RETURN TO CLINIC: 12 months    Patient care discussed with attending neurologist, Dr. Decker, who agrees with my assessment and plan.    Chris Aguila MD  Neurology PGY3    ATTENDING ADDENDUM: Patient seen and examined today with resident Dr Aguila at the [facility]. Agree with his impression and recommendations as above. TT spent for patient care 15 minutes; more than half was counseling. Glenn Decker MD

## 2019-06-03 NOTE — NURSING NOTE
Chief Complaint   Patient presents with     RECHECK     [facility] RETURN MUSCULAR DYSTROPHY       Elizabeth Raya, EMT

## 2019-06-04 LAB
EXPTIME-PRE: 7.7 SEC
FEF2575-%PRED-PRE: 148 %
FEF2575-PRE: 3.14 L/SEC
FEF2575-PRED: 2.12 L/SEC
FEFMAX-%PRED-PRE: 124 %
FEFMAX-PRE: 9.16 L/SEC
FEFMAX-PRED: 7.35 L/SEC
FEV1-%PRED-PRE: 116 %
FEV1-PRE: 2.95 L
FEV1FEV6-PRE: 82 %
FEV1FEV6-PRED: 78 %
FEV1FVC-PRE: 81 %
FEV1FVC-PRED: 78 %
FIFMAX-PRE: 5.77 L/SEC
FVC-%PRED-PRE: 112 %
FVC-PRE: 3.63 L
FVC-PRED: 3.24 L
MEP-PRE: 140 CMH2O
MIP-PRE: -100 CMH2O

## 2019-07-23 ENCOUNTER — RECORDS - HEALTHEAST (OUTPATIENT)
Dept: LAB | Facility: CLINIC | Age: 69
End: 2019-07-23

## 2019-07-23 LAB
CHOLEST SERPL-MCNC: 137 MG/DL
FASTING STATUS PATIENT QL REPORTED: ABNORMAL
HDLC SERPL-MCNC: 49 MG/DL
LDLC SERPL CALC-MCNC: 56 MG/DL
TRIGL SERPL-MCNC: 161 MG/DL

## 2019-09-29 ENCOUNTER — HEALTH MAINTENANCE LETTER (OUTPATIENT)
Age: 69
End: 2019-09-29

## 2020-03-15 ENCOUNTER — HEALTH MAINTENANCE LETTER (OUTPATIENT)
Age: 70
End: 2020-03-15

## 2020-09-25 ENCOUNTER — RECORDS - HEALTHEAST (OUTPATIENT)
Dept: LAB | Facility: CLINIC | Age: 70
End: 2020-09-25

## 2020-09-25 LAB
CHOLEST SERPL-MCNC: 168 MG/DL
FASTING STATUS PATIENT QL REPORTED: NO
HDLC SERPL-MCNC: 54 MG/DL
LDLC SERPL CALC-MCNC: 86 MG/DL
TRIGL SERPL-MCNC: 138 MG/DL

## 2021-01-14 ENCOUNTER — HEALTH MAINTENANCE LETTER (OUTPATIENT)
Age: 71
End: 2021-01-14

## 2021-05-08 ENCOUNTER — HEALTH MAINTENANCE LETTER (OUTPATIENT)
Age: 71
End: 2021-05-08

## 2021-10-23 ENCOUNTER — HEALTH MAINTENANCE LETTER (OUTPATIENT)
Age: 71
End: 2021-10-23

## 2021-12-16 ENCOUNTER — TRANSFERRED RECORDS (OUTPATIENT)
Dept: HEALTH INFORMATION MANAGEMENT | Facility: CLINIC | Age: 71
End: 2021-12-16
Payer: MEDICARE

## 2022-03-26 DIAGNOSIS — Z11.59 ENCOUNTER FOR SCREENING FOR OTHER VIRAL DISEASES: Primary | ICD-10-CM

## 2022-04-05 ENCOUNTER — HOSPITAL ENCOUNTER (EMERGENCY)
Facility: HOSPITAL | Age: 72
Discharge: HOME | End: 2022-04-06
Attending: EMERGENCY MEDICINE | Admitting: EMERGENCY MEDICINE
Payer: MEDICARE

## 2022-04-05 ENCOUNTER — TRANSFERRED RECORDS (OUTPATIENT)
Dept: HEALTH INFORMATION MANAGEMENT | Facility: CLINIC | Age: 72
End: 2022-04-05

## 2022-04-05 ENCOUNTER — APPOINTMENT (OUTPATIENT)
Dept: CT IMAGING | Facility: HOSPITAL | Age: 72
End: 2022-04-05
Attending: EMERGENCY MEDICINE
Payer: MEDICARE

## 2022-04-05 VITALS
HEART RATE: 86 BPM | OXYGEN SATURATION: 98 % | BODY MASS INDEX: 25.33 KG/M2 | SYSTOLIC BLOOD PRESSURE: 127 MMHG | TEMPERATURE: 99.4 F | WEIGHT: 143 LBS | DIASTOLIC BLOOD PRESSURE: 67 MMHG | RESPIRATION RATE: 18 BRPM

## 2022-04-05 DIAGNOSIS — R11.0 NAUSEA: ICD-10-CM

## 2022-04-05 DIAGNOSIS — K57.92 ACUTE DIVERTICULITIS: ICD-10-CM

## 2022-04-05 DIAGNOSIS — R10.32 ABDOMINAL PAIN, LEFT LOWER QUADRANT: ICD-10-CM

## 2022-04-05 LAB
ALBUMIN SERPL-MCNC: 3.8 G/DL (ref 3.5–5)
ALBUMIN UR-MCNC: NEGATIVE MG/DL
ALP SERPL-CCNC: 106 U/L (ref 45–120)
ALT SERPL W P-5'-P-CCNC: 24 U/L (ref 0–45)
ANION GAP SERPL CALCULATED.3IONS-SCNC: 8 MMOL/L (ref 5–18)
APPEARANCE UR: CLEAR
AST SERPL W P-5'-P-CCNC: 20 U/L (ref 0–40)
BILIRUB SERPL-MCNC: 0.5 MG/DL (ref 0–1)
BILIRUB UR QL STRIP: NEGATIVE
BUN SERPL-MCNC: 14 MG/DL (ref 8–28)
CALCIUM SERPL-MCNC: 9.6 MG/DL (ref 8.5–10.5)
CHLORIDE BLD-SCNC: 107 MMOL/L (ref 98–107)
CO2 SERPL-SCNC: 26 MMOL/L (ref 22–31)
COLOR UR AUTO: ABNORMAL
CREAT SERPL-MCNC: 0.9 MG/DL (ref 0.7–1.3)
ERYTHROCYTE [DISTWIDTH] IN BLOOD BY AUTOMATED COUNT: 11.9 % (ref 10–15)
GFR SERPL CREATININE-BSD FRML MDRD: >90 ML/MIN/1.73M2
GLUCOSE BLD-MCNC: 116 MG/DL (ref 70–125)
GLUCOSE UR STRIP-MCNC: NEGATIVE MG/DL
HCT VFR BLD AUTO: 45 % (ref 40–53)
HGB BLD-MCNC: 15.2 G/DL (ref 13.3–17.7)
HGB UR QL STRIP: NEGATIVE
HOLD SPECIMEN: NORMAL
KETONES UR STRIP-MCNC: NEGATIVE MG/DL
LACTATE SERPL-SCNC: 0.9 MMOL/L (ref 0.7–2)
LEUKOCYTE ESTERASE UR QL STRIP: NEGATIVE
LIPASE SERPL-CCNC: 28 U/L (ref 0–52)
MCH RBC QN AUTO: 31.2 PG (ref 26.5–33)
MCHC RBC AUTO-ENTMCNC: 33.8 G/DL (ref 31.5–36.5)
MCV RBC AUTO: 92 FL (ref 78–100)
MUCOUS THREADS #/AREA URNS LPF: PRESENT /LPF
NITRATE UR QL: NEGATIVE
PH UR STRIP: 6.5 [PH] (ref 5–7)
PLATELET # BLD AUTO: 253 10E3/UL (ref 150–450)
POTASSIUM BLD-SCNC: 4.9 MMOL/L (ref 3.5–5)
PROT SERPL-MCNC: 7.3 G/DL (ref 6–8)
RBC # BLD AUTO: 4.87 10E6/UL (ref 4.4–5.9)
RBC URINE: <1 /HPF
SODIUM SERPL-SCNC: 141 MMOL/L (ref 136–145)
SP GR UR STRIP: 1.03 (ref 1–1.03)
UROBILINOGEN UR STRIP-MCNC: <2 MG/DL
WBC # BLD AUTO: 13.5 10E3/UL (ref 4–11)
WBC URINE: 1 /HPF

## 2022-04-05 PROCEDURE — 96374 THER/PROPH/DIAG INJ IV PUSH: CPT | Mod: 59

## 2022-04-05 PROCEDURE — 81001 URINALYSIS AUTO W/SCOPE: CPT | Performed by: EMERGENCY MEDICINE

## 2022-04-05 PROCEDURE — 96361 HYDRATE IV INFUSION ADD-ON: CPT

## 2022-04-05 PROCEDURE — 250N000013 HC RX MED GY IP 250 OP 250 PS 637: Performed by: EMERGENCY MEDICINE

## 2022-04-05 PROCEDURE — 85027 COMPLETE CBC AUTOMATED: CPT | Performed by: EMERGENCY MEDICINE

## 2022-04-05 PROCEDURE — 250N000011 HC RX IP 250 OP 636: Performed by: EMERGENCY MEDICINE

## 2022-04-05 PROCEDURE — 96375 TX/PRO/DX INJ NEW DRUG ADDON: CPT

## 2022-04-05 PROCEDURE — 36415 COLL VENOUS BLD VENIPUNCTURE: CPT | Performed by: EMERGENCY MEDICINE

## 2022-04-05 PROCEDURE — 74177 CT ABD & PELVIS W/CONTRAST: CPT

## 2022-04-05 PROCEDURE — 83605 ASSAY OF LACTIC ACID: CPT | Performed by: EMERGENCY MEDICINE

## 2022-04-05 PROCEDURE — 80053 COMPREHEN METABOLIC PANEL: CPT | Performed by: EMERGENCY MEDICINE

## 2022-04-05 PROCEDURE — 99285 EMERGENCY DEPT VISIT HI MDM: CPT | Mod: 25

## 2022-04-05 PROCEDURE — 258N000003 HC RX IP 258 OP 636: Performed by: EMERGENCY MEDICINE

## 2022-04-05 PROCEDURE — 83690 ASSAY OF LIPASE: CPT | Performed by: EMERGENCY MEDICINE

## 2022-04-05 RX ORDER — ONDANSETRON 2 MG/ML
4 INJECTION INTRAMUSCULAR; INTRAVENOUS ONCE
Status: COMPLETED | OUTPATIENT
Start: 2022-04-05 | End: 2022-04-05

## 2022-04-05 RX ORDER — MORPHINE SULFATE 4 MG/ML
4 INJECTION, SOLUTION INTRAMUSCULAR; INTRAVENOUS ONCE
Status: COMPLETED | OUTPATIENT
Start: 2022-04-05 | End: 2022-04-05

## 2022-04-05 RX ORDER — ONDANSETRON 4 MG/1
4 TABLET, ORALLY DISINTEGRATING ORAL EVERY 8 HOURS PRN
Qty: 10 TABLET | Refills: 0 | Status: SHIPPED | OUTPATIENT
Start: 2022-04-05 | End: 2022-04-08

## 2022-04-05 RX ORDER — IOPAMIDOL 755 MG/ML
100 INJECTION, SOLUTION INTRAVASCULAR ONCE
Status: COMPLETED | OUTPATIENT
Start: 2022-04-05 | End: 2022-04-05

## 2022-04-05 RX ADMIN — MORPHINE SULFATE 4 MG: 4 INJECTION INTRAVENOUS at 21:52

## 2022-04-05 RX ADMIN — IOPAMIDOL 100 ML: 755 INJECTION, SOLUTION INTRAVENOUS at 22:48

## 2022-04-05 RX ADMIN — AMOXICILLIN AND CLAVULANATE POTASSIUM 1 TABLET: 875; 125 TABLET, FILM COATED ORAL at 23:46

## 2022-04-05 RX ADMIN — SODIUM CHLORIDE 1000 ML: 9 INJECTION, SOLUTION INTRAVENOUS at 21:51

## 2022-04-05 RX ADMIN — ONDANSETRON 4 MG: 2 INJECTION INTRAMUSCULAR; INTRAVENOUS at 21:51

## 2022-04-05 ASSESSMENT — ENCOUNTER SYMPTOMS
NAUSEA: 1
WEAKNESS: 0
DIARRHEA: 0
COUGH: 0
CHILLS: 0
HEADACHES: 0
HEMATURIA: 0
DYSURIA: 0
ABDOMINAL PAIN: 1
FREQUENCY: 0
CONSTIPATION: 0
VOMITING: 0
SHORTNESS OF BREATH: 0
NUMBNESS: 0
FEVER: 1
FLANK PAIN: 1

## 2022-04-06 NOTE — ED TRIAGE NOTES
Pt presents to triage ambulatory w/ wife with LLQ abdominal pain only w/ palpation, and pain he has never experienced in mid lower abdomen. Abdominal pain 8/10 all day since this AM. Fever 100 at home-tylenol last approximately 1200 today. Hx of diverticulitis, ulcers, hernia on left side

## 2022-04-06 NOTE — ED PROVIDER NOTES
"EMERGENCY DEPARTMENT ENCOUNTER      NAME: Vasquez Ann  YOB: 1950  MRN: [id number]      FINAL IMPRESSION  1. Acute diverticulitis    2. Abdominal pain, left lower quadrant    3. Nausea        MEDICAL DECISION MAKING   Pertinent Labs & Imaging studies reviewed. (See chart for details)    Vasquez Ann is a 71 year old male who presents for evaluation of abdominal pain. Patient reports long history of \"GI issues\" including a spegelian hernia, duodenal ulcers, diverticulitis, kidney stones. He follows with [facility] but has not been able to get in for a repeat EGD and colonoscopy due to providers being backed up and requiring specific anesthesia. He is scheduled to have the studies at the beginning of May. He reports onset of current symptoms this morning with persistence all day. He complains of LLQ pain with associated nausea. He also developed a low grade temp that prompted him to come in. Vitals on arrival stable. Remainder of history and exam, as below.     I considered a broad differential including but not limited to hepatobiliary disease, pancreatitis, appendicitis, diverticulitis, nephrolithiasis, pyelonephritis, cystitis, hernia, small bowel obstruction/ileus, perforation, AAA, mesenteric ischemia. I have lower suspicion for symptoms secondary to cardiopulmonary or vascular process given history and exam. Discussed options for workup with the patient. We agreed on plan for labs, UA, CT, and management of symptoms with IVF and IV analgesic/antiemetic.     Labs notable for mild leukocytosis with WBC 13.5, consistent with infectious/inflammatory process.  No acute anemia.  CMP unremarkable without evidence of acidosis, electrolyte derangement, acute kidney injury, or hepatobiliary disease.  Lipase negative, unlikely pancreatitis.  UA without evidence of infection.  CT abdomen/pelvis showed evidence of acute diverticulitis without complication.  I rechecked the patient and reviewed " these results.  He had improvement after initial interventions and was not surprised by results of CT scan.  We discussed options for management.  Patient would very much like to go home today which does seem reasonable, as he is tolerating p.o. without difficulty.  He is familiar with how to manage diverticulitis at home and has relationship with [facility].  We have agreed on plan to give first dose here and if he tolerates, discharged with a prescription for Augmentin and Zofran.    Patient was given a dose of Augmentin which he tolerated without difficulty.  After this, he was eager and ready to go home.  He will call [facility] to see if he can expedite his follow-up appointment but otherwise, will follow up with them as planned.    We reviewed warning signs and symptoms, and I instructed Mr. Ann to return to the emergency department immediately if he develops any new or worsening symptoms. I provided additional verbal discharge instructions. Mr. Ann expressed understanding and agreement with this plan of care, his questions were answered, and he was discharged in stable condition.         ED COURSE  9:45 PM I performed my initial history and physical exam as well as discussed ED course and plan.   11:30 PM Rechecked and updated patient on results.       MEDICATIONS GIVEN IN THE ED  Medications   0.9% sodium chloride BOLUS (0 mLs Intravenous Stopped 4/5/22 2300)   ondansetron (ZOFRAN) injection 4 mg (4 mg Intravenous Given 4/5/22 2151)   morphine (PF) injection 4 mg (4 mg Intravenous Given 4/5/22 2152)   iopamidol (ISOVUE-370) solution 100 mL (100 mLs Intravenous Given 4/5/22 2248)   amoxicillin-clavulanate (AUGMENTIN) 875-125 MG per tablet 1 tablet (1 tablet Oral Given 4/5/22 6716)       NEW PRESCRIPTIONS STARTED AT TODAY'S VISIT  Discharge Medication List as of 4/6/2022 12:09 AM      START taking these medications    Details   amoxicillin-clavulanate (AUGMENTIN) 875-125 MG tablet Take 1  "tablet by mouth 2 times daily for 7 days, Disp-14 tablet, R-0, E-Prescribe      ondansetron (ZOFRAN ODT) 4 MG ODT tab Take 1 tablet (4 mg) by mouth every 8 hours as needed for nausea, Disp-10 tablet, R-0, E-Prescribe                =================================================================    Chief Complaint   Patient presents with     Abdominal Pain         HPI:    Patient information was obtained from: Patient    Use of : N/A     Vasquez Ann is a 71 year old male with history of spegelian hernia s/p repair, duodenal ulcers, diverticulitis, and kidney stones who presents for evaluation of abdominal pain.  Patient reports long history of \"GI issues\" and follows with [facility].  He states that since COVID beginning of the year, he has had difficulty getting in for follow-up but has had persistent, intermittent symptoms.  He was able to schedule an endoscopy and colonoscopy for the beginning of May.  Today, he presents with pain that began this morning.  He locates discomfort to the left lower quadrant and describes it as constant, waxing and waning in severity, and sharp in quality.  He took Tylenol around noon and did have some improvement.  He reports associated low-grade fever up to 100.2  F at home, prompting him to come into the emergency department.  He also reports that he developed some nausea on arrival here in triage but did not experience this at home.  He denies associated chest pain, difficulty breathing, dysuria, hematuria, diarrhea, constipation, or other symptoms.  He has a history of kidney stones but does not feel his current symptoms are consistent with that diagnosis.  He is particularly concerned about diverticulitis.  No other new complaints.      RELEVANT HISTORY, MEDICATIONS, & ALLERGIES   Past medical history, surgical history, family history, medications, and allergies reviewed and pertinent noted in HPI. See end of note for comprehensive list.    REVIEW OF " SYSTEMS:  Review of Systems   Constitutional: Positive for fever. Negative for chills.   HENT: Negative.    Respiratory: Negative for cough and shortness of breath.    Cardiovascular: Negative for chest pain and leg swelling.   Gastrointestinal: Positive for abdominal pain (LLQ) and nausea. Negative for constipation, diarrhea and vomiting.   Genitourinary: Positive for flank pain (with abdominal pain). Negative for dysuria, frequency, hematuria and urgency.   Neurological: Negative for weakness, numbness and headaches.   All other systems reviewed and are negative.      PHYSICAL EXAM:    Vitals: /67 (BP Location: Left arm, Patient Position: Left side)   Pulse 86   Temp 99.4  F (37.4  C)   Resp 18   Wt 64.9 kg (143 lb)   SpO2 98%   BMI 25.33 kg/m     General: Alert and interactive, comfortable appearing.  HENT: Oropharynx without erythema or exudates. MMM.   Eyes: Pupils mid-sized and equally reactive.   Neck: Full AROM.   Cardiovascular: Regular rate and rhythm. Peripheral pulses 2+ bilaterally.  Chest/Pulmonary: Normal work of breathing. Lung sounds clear and equal throughout, no wheezes or crackles. No chest wall tenderness or deformities.  Abdomen: Soft, nondistended.  Tenderness palpation left lower quadrant without guarding or rebound.  Back/Spine: No CVA or midline tenderness.  Extremities: Normal ROM of all major joints. No lower extremity edema.   Skin: Warm and dry. Normal skin color.   Neuro: Speech clear. CNs grossly intact. Moves all extremities appropriately. Strength and sensation grossly intact to all extremities.   Psych: Normal affect/mood, cooperative, memory appropriate.     LAB  Labs Ordered and Resulted from Time of ED Arrival to Time of ED Departure   CBC WITH PLATELETS - Abnormal       Result Value    WBC Count 13.5 (*)     RBC Count 4.87      Hemoglobin 15.2      Hematocrit 45.0      MCV 92      MCH 31.2      MCHC 33.8      RDW 11.9      Platelet Count 253     ROUTINE UA WITH  MICROSCOPIC REFLEX TO CULTURE - Abnormal    Color Urine Light Yellow      Appearance Urine Clear      Glucose Urine Negative      Bilirubin Urine Negative      Ketones Urine Negative      Specific Gravity Urine 1.028      Blood Urine Negative      pH Urine 6.5      Protein Albumin Urine Negative      Urobilinogen Urine <2.0      Nitrite Urine Negative      Leukocyte Esterase Urine Negative      Mucus Urine Present (*)     RBC Urine <1      WBC Urine 1     COMPREHENSIVE METABOLIC PANEL - Normal    Sodium 141      Potassium 4.9      Chloride 107      Carbon Dioxide (CO2) 26      Anion Gap 8      Urea Nitrogen 14      Creatinine 0.90      Calcium 9.6      Glucose 116      Alkaline Phosphatase 106      AST 20      ALT 24      Protein Total 7.3      Albumin 3.8      Bilirubin Total 0.5      GFR Estimate >90     LIPASE - Normal    Lipase 28     LACTIC ACID WHOLE BLOOD - Normal    Lactic Acid 0.9         RADIOLOGY  CT Abdomen Pelvis w Contrast   Final Result   IMPRESSION:    1.  Acute diverticulitis at the junction of the descending and sigmoid colon. No free air or drainable abscess.            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     Skin cancer     squamous cell hands, arms and legs     Stroke (H) 2004    h/o PFO     Past Surgical History:   Procedure Laterality Date     ABDOMEN SURGERY  3/2014    Incarcerated spigelian hernia recurence 12/30/2016     APPENDECTOMY  3/2014     APPENDECTOMY       CARDIAC SURGERY  2007    PFO closure thru femoral artery     COLONOSCOPY  2011/ 2014    Polyps     DENTAL SURGERY  1973    wisodm teeth     GI  SURGERY  2010/2014    upper GI     HERNIA REPAIR  3/2014    Incarcerated spigelian hernia/ recurence 12/30/2016     HERNIA REPAIR       PATENT FORAMEN OVALE CLOSURE  07/2007       CURRENT MEDICATIONS    Current Outpatient Medications   Medication Instructions     aspirin 81 MG tablet 1 tablet, Oral, DAILY     atorvastatin (LIPITOR) 20 mg, Oral, DAILY     cholecalciferol (VITAMIN D-1000 MAX ST) 1,000 Units, Oral, DAILY     co-enzyme Q-10 100 mg, Oral, DAILY     DULoxetine HCl (CYMBALTA PO) 60 mg, Oral, DAILY     gabapentin (NEURONTIN) 300 MG capsule Take 2 capsules in the morning and noon, and 3 in the evening     IRON PO 1 tablet, Oral, DAILY     omeprazole (PRILOSEC) 20 mg, Oral, 2 TIMES DAILY       ALLERGIES    Allergies   Allergen Reactions     Clopidogrel Hives     Clopidogrel Bisulfate Hives and Swelling     Plavix     Nsaids      History of ulcers       FAMILY HISTORY    Family History   Problem Relation Age of Onset     Cerebrovascular Disease Mother         stroke     Cancer Father         Lung Caner 1993       SOCIAL HISTORY    Social History     Socioeconomic History     Marital status:      Spouse name: Not on file     Number of children: Not on file     Years of education: Not on file     Highest education level: Not on file   Occupational History     Not on file   Tobacco Use     Smoking status: Former Smoker     Types: Cigarettes     Start date: 1/1/1977     Quit date: 1/1/2012     Years since quitting: 10.2     Smokeless tobacco: Never Used     Tobacco comment: Was a socailsmoker/ quit completely about 3 years ago   Substance and Sexual Activity     Alcohol use: Yes     Comment: rare, 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 No   Social History Narrative     Not on file       So Ford M.D.  Emergency  [facility] EMERGENCY DEPARTMENT  [facility] [facility] [place] [address]  [phone]  Dept: [phone]     So Ford MD  04/06/22 0417

## 2022-04-06 NOTE — DISCHARGE INSTRUCTIONS
You were seen in the Emergency Department for abdominal pain, fever, and nausea. Your CT scan showed that you havediverticulitis.  This is inflammation of an area of your intestines.     You should follow these recommendations:  1.  Antibiotics: Augmentin. Take this two times per day for the next 7 days. You were given your first dose here.     2.  Diet:  For the next 2-3 days (while the antibiotics are working), you may want to try liquids or soft foods.When you start to feel better, you can start to add back some foods.  The best foods to eat are high in fiber (fruits, veggies) but avoid seeds, nuts, corn kernels, or other small hard foods.  There is no evidence for dietary restrictions so you only need to eat a liquid/soft food diet if it seems like solids make your pain worse.    3.  For pain:  - You may take 650-1000mg of Acetaminophen (Tylenol).  Please do not use more than 3000 mg in a 24 hour period. Tylenol is an effective drug when taken at the prescribed dosages but can cause bodily injury including liver damage if taken too often or at too high of dose.    4. For nausea:   - I am sending you with a prescription for zofran to use as needed.     4. Follow up with your doctor and [facility] for recheck.  Return to the Emergency Department if your pain becomes worse, if you are unable to keep down liquids, have fevers > 101 F or if you have any other symptoms of concern.    Below is some information that you might find informative and useful.    Thank you for choosing [facility]. It was a pleasure taking care of you today!   - Dr. So Ford

## 2022-04-19 ASSESSMENT — ENCOUNTER SYMPTOMS
ABDOMINAL PAIN: 1
CONSTIPATION: 0
MUSCLE WEAKNESS: 1
MUSCLE CRAMPS: 1
SPEECH CHANGE: 0
NAUSEA: 0
JAUNDICE: 0
DIARRHEA: 0
TREMORS: 0
HEARTBURN: 1
MEMORY LOSS: 0
DIZZINESS: 0
BACK PAIN: 1
NECK PAIN: 1
VOMITING: 0
NUMBNESS: 1
STIFFNESS: 1
BLOOD IN STOOL: 0
SEIZURES: 0
TINGLING: 1
PARALYSIS: 0
WEAKNESS: 1
MYALGIAS: 1
JOINT SWELLING: 0
RECTAL PAIN: 0
LOSS OF CONSCIOUSNESS: 0
BOWEL INCONTINENCE: 0
ARTHRALGIAS: 0
DISTURBANCES IN COORDINATION: 0
BLOATING: 1

## 2022-04-20 ENCOUNTER — OFFICE VISIT (OUTPATIENT)
Dept: NEUROLOGY | Facility: CLINIC | Age: 72
End: 2022-04-20
Payer: MEDICARE

## 2022-04-20 VITALS — SYSTOLIC BLOOD PRESSURE: 159 MMHG | DIASTOLIC BLOOD PRESSURE: 77 MMHG | OXYGEN SATURATION: 99 % | HEART RATE: 80 BPM

## 2022-04-20 DIAGNOSIS — M54.12 LEFT CERVICAL RADICULOPATHY: ICD-10-CM

## 2022-04-20 DIAGNOSIS — G71.19 PARAMYOTONIA CONGENITA: Primary | ICD-10-CM

## 2022-04-20 DIAGNOSIS — M62.838 MUSCLE SPASM: ICD-10-CM

## 2022-04-20 PROCEDURE — 99214 OFFICE O/P EST MOD 30 MIN: CPT | Performed by: PSYCHIATRY & NEUROLOGY

## 2022-04-20 RX ORDER — CLONAZEPAM 0.5 MG/1
0.5 TABLET ORAL DAILY PRN
COMMUNITY
Start: 2022-04-15

## 2022-04-20 RX ORDER — SODIUM FLUORIDE 1.1 G/100G
CREAM ORAL 2 TIMES DAILY
COMMUNITY
Start: 2021-10-25 | End: 2023-04-07

## 2022-04-20 RX ORDER — GABAPENTIN 300 MG/1
300 CAPSULE ORAL 3 TIMES DAILY
Qty: 90 CAPSULE | Refills: 3 | Status: SHIPPED | OUTPATIENT
Start: 2022-04-20 | End: 2022-05-05

## 2022-04-20 ASSESSMENT — PAIN SCALES - GENERAL: PAINLEVEL: MODERATE PAIN (5)

## 2022-04-20 NOTE — PATIENT INSTRUCTIONS
I would try gabapentin again, 300 mg three times a day for the muscle pain and stiffness    Please call or send a [employer] message after 2-3 weeks. If not better we can try increasing it back to 600 mg three times a day which is what you used to take in the past.     Follow up 1 year

## 2022-04-20 NOTE — LETTER
2022       RE: Vasquez Ann  [address]  [address]  [facility] [address]     Dear Colleague,    Thank you for referring your patient, Vasquez Ann, to the [facility] [place] at [facility]. Please see a copy of my visit note below.    Service Date: 2022    Enrrique Reeves MD  [place] [address] [place] [id number]     RE:     Vasquez Ann  MRN:  [id number]  :  1950    Dear Dr. Reeves:    I had the pleasure to see Mr. Ann at the [facility] in followup.  I last saw him 3 years ago.  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  He has a longstanding history of muscle pain, stiffness and cramping in the upper and lower extremities, which can be precipitated by exercise.  In the past he also had restless leg syndrome.  A few years ago, Mr. Ann discontinued gabapentin that I was prescribing him, because he did not feel he had great benefit.  He also stopped taking Cymbalta a couple of years ago; however, in the last 6 months to a year, his muscle pain and stiffness have worsened and it takes him a longer time to recover after he walks and exercises.  He is especially stiff in the morning when he gets up and it takes a while for this to resolve.  He also has worsening abdominal symptoms that have been very longstanding and some of which could be related to the myotonia. Specifically, he is experiencing an uncomfortable muscle spasm or cramp in the stomach region that in the past was not relieved with antacids or PPIs.  He has had a long followup with gastroenterology and he will actually see them again next month and have a repeat EGD and colonoscopy to look for new pathology.      In the past we discussed mexiletine, but he never took it due to concern of GI side effects.  He had an episode of  acute diverticulitis 3 weeks ago, for which he went to a local ED and received antibiotics.  This seems to be resolving now.  Lastly, he has some tingling and pain in the left shoulder and the pain often radiates to the hand and involves digits 2 and 3 dorsally.  This is likely due to cervical radiculopathy.  He had a repeat MRI of the cervical spine in 11/2021.  I reviewed it and there are no significant changes from prior.  Importantly, this pain seems to be improving a lot now and does not bother him that much.  A corticosteroid injection in the cervical spine was discussed, but not performed as his symptoms were improving.     Medications were reviewed and are as per Epic record.    BP (!) 159/77   Pulse 80   SpO2 99%     On exam, his strength remains 5/5 in upper and lower extremities proximally and distally.  I do not see any hand  myotonia today.  His reflexes are 2+ in brachioradialis and triceps and symmetric.  Biceps jerk is 2+ on the right, trace to 1+ on the left with some asymmetry, which could be due to a left C6 radiculopathy.  Sensory exam of the thumb, index, and middle finger is intact and symmetric.    In summary, Mr. Ann has chronic muscle pain and stiffness, which in part could relate to his paramyotonia congenita.  Because those symptoms are worse lately, I suggested him to try gabapentin again at 300 mg t.i.d. this time.  He should message me in 2-3 weeks.  If not better, we should get him back to his original dose, which was 600 mg t.i.d.  If this does not work, then in the future we can consider mexiletine again depending on the outcome of his GI evaluation.  He will continue following with GI, but again I believe that some of the pain he reports over the abdominal wall could be related to the myotonia.      As for the left cervical radiculopathy, this seems to be improving and I do not think he needs to take any additional treatment steps right now.  I will follow up with him  in 1 year or sooner if needed.    Total time spent on this encounter today 31 minutes, of which 16 face-to-face with the patient, 10 in post-visit note dictation, editing and orders and 5 in pre-visit chart review.    Sincerely,    Glenn Decker MD        D: 2022   T: 2022   MT: CAROL    Name:     LANG OLIVASBrandt  MRN:      [id number]        Account:      [id number]   :      1950           Service Date: 2022       Document: [id number]

## 2022-04-21 NOTE — PROGRESS NOTES
Service Date: 2022    [place] [address]     RE:     Vasquez Ann  MRN:  [id number]  :  1950    Dear Dr. Reeves:    I had the pleasure to see Mr. Ann at the [facility] in followup.  I last saw him 3 years ago.  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  He has a longstanding history of muscle pain, stiffness and cramping in the upper and lower extremities, which can be precipitated by exercise.  In the past he also had restless leg syndrome.  A few years ago, Mr. Ann discontinued gabapentin that I was prescribing him, because he did not feel he had great benefit.  He also stopped taking Cymbalta a couple of years ago; however, in the last 6 months to a year, his muscle pain and stiffness have worsened and it takes him a longer time to recover after he walks and exercises.  He is especially stiff in the morning when he gets up and it takes a while for this to resolve.  He also has worsening abdominal symptoms that have been very longstanding and some of which could be related to the myotonia. Specifically, he is experiencing an uncomfortable muscle spasm or cramp in the stomach region that in the past was not relieved with antacids or PPIs.  He has had a long followup with gastroenterology and he will actually see them again next month and have a repeat EGD and colonoscopy to look for new pathology.      In the past we discussed mexiletine, but he never took it due to concern of GI side effects.  He had an episode of acute diverticulitis 3 weeks ago, for which he went to a local ED and received antibiotics.  This seems to be resolving now.  Lastly, he has some tingling and pain in the left shoulder and the pain often radiates to the hand and involves digits 2 and 3 dorsally.  This is likely due to cervical radiculopathy.  He had a repeat MRI of  the cervical spine in 11/2021.  I reviewed it and there are no significant changes from prior.  Importantly, this pain seems to be improving a lot now and does not bother him that much.  A corticosteroid injection in the cervical spine was discussed, but not performed as his symptoms were improving.     Medications were reviewed and are as per Epic record.    BP (!) 159/77   Pulse 80   SpO2 99%     On exam, his strength remains 5/5 in upper and lower extremities proximally and distally.  I do not see any hand  myotonia today.  His reflexes are 2+ in brachioradialis and triceps and symmetric.  Biceps jerk is 2+ on the right, trace to 1+ on the left with some asymmetry, which could be due to a left C6 radiculopathy.  Sensory exam of the thumb, index, and middle finger is intact and symmetric.    In summary, Mr. Ann has chronic muscle pain and stiffness, which in part could relate to his paramyotonia congenita.  Because those symptoms are worse lately, I suggested him to try gabapentin again at 300 mg t.i.d. this time.  He should message me in 2-3 weeks.  If not better, we should get him back to his original dose, which was 600 mg t.i.d.  If this does not work, then in the future we can consider mexiletine again depending on the outcome of his GI evaluation.  He will continue following with GI, but again I believe that some of the pain he reports over the abdominal wall could be related to the myotonia.      As for the left cervical radiculopathy, this seems to be improving and I do not think he needs to take any additional treatment steps right now.  I will follow up with him in 1 year or sooner if needed.    Total time spent on this encounter today 31 minutes, of which 16 face-to-face with the patient, 10 in post-visit note dictation, editing and orders and 5 in pre-visit chart review.    Sincerely,    Glenn Decker MD        D: 04/20/2022   T: 04/20/2022   MT: CAROL    Name:     BRENDON  LANG PEARSON  MRN:      [id number]        Account:      [id number]   :      1950           Service Date: 2022       Document: [id number]

## 2022-05-05 RX ORDER — FAMOTIDINE 20 MG/1
20 TABLET, FILM COATED ORAL AT BEDTIME
COMMUNITY
End: 2023-05-01

## 2022-05-06 ENCOUNTER — LAB (OUTPATIENT)
Dept: LAB | Facility: CLINIC | Age: 72
End: 2022-05-06
Attending: INTERNAL MEDICINE
Payer: MEDICARE

## 2022-05-06 DIAGNOSIS — Z11.59 ENCOUNTER FOR SCREENING FOR OTHER VIRAL DISEASES: ICD-10-CM

## 2022-05-06 LAB — SARS-COV-2 RNA RESP QL NAA+PROBE: NEGATIVE

## 2022-05-06 PROCEDURE — U0005 INFEC AGEN DETEC AMPLI PROBE: HCPCS

## 2022-05-06 PROCEDURE — U0003 INFECTIOUS AGENT DETECTION BY NUCLEIC ACID (DNA OR RNA); SEVERE ACUTE RESPIRATORY SYNDROME CORONAVIRUS 2 (SARS-COV-2) (CORONAVIRUS DISEASE [COVID-19]), AMPLIFIED PROBE TECHNIQUE, MAKING USE OF HIGH THROUGHPUT TECHNOLOGIES AS DESCRIBED BY CMS-2020-01-R: HCPCS

## 2022-05-10 ENCOUNTER — ANESTHESIA EVENT (OUTPATIENT)
Dept: SURGERY | Facility: CLINIC | Age: 72
End: 2022-05-10
Payer: MEDICARE

## 2022-05-10 ENCOUNTER — HOSPITAL ENCOUNTER (OUTPATIENT)
Facility: CLINIC | Age: 72
Discharge: HOME | End: 2022-05-10
Attending: INTERNAL MEDICINE | Admitting: INTERNAL MEDICINE
Payer: MEDICARE

## 2022-05-10 ENCOUNTER — ANESTHESIA (OUTPATIENT)
Dept: SURGERY | Facility: CLINIC | Age: 72
End: 2022-05-10
Payer: MEDICARE

## 2022-05-10 VITALS
BODY MASS INDEX: 22.9 KG/M2 | HEIGHT: 64 IN | SYSTOLIC BLOOD PRESSURE: 115 MMHG | WEIGHT: 134.1 LBS | RESPIRATION RATE: 15 BRPM | HEART RATE: 69 BPM | DIASTOLIC BLOOD PRESSURE: 62 MMHG | TEMPERATURE: 97.2 F | OXYGEN SATURATION: 99 %

## 2022-05-10 LAB
COLONOSCOPY: NORMAL
UPPER GI ENDOSCOPY: NORMAL

## 2022-05-10 PROCEDURE — 258N000003 HC RX IP 258 OP 636: Performed by: ANESTHESIOLOGY

## 2022-05-10 PROCEDURE — 88305 TISSUE EXAM BY PATHOLOGIST: CPT | Mod: TC | Performed by: INTERNAL MEDICINE

## 2022-05-10 PROCEDURE — 250N000011 HC RX IP 250 OP 636: Performed by: ANESTHESIOLOGY

## 2022-05-10 PROCEDURE — 360N000075 HC SURGERY LEVEL 2, PER MIN: Performed by: INTERNAL MEDICINE

## 2022-05-10 PROCEDURE — 272N000001 HC OR GENERAL SUPPLY STERILE: Performed by: INTERNAL MEDICINE

## 2022-05-10 PROCEDURE — 370N000017 HC ANESTHESIA TECHNICAL FEE, PER MIN: Performed by: INTERNAL MEDICINE

## 2022-05-10 PROCEDURE — 710N000012 HC RECOVERY PHASE 2, PER MINUTE: Performed by: INTERNAL MEDICINE

## 2022-05-10 PROCEDURE — 250N000011 HC RX IP 250 OP 636: Performed by: NURSE ANESTHETIST, CERTIFIED REGISTERED

## 2022-05-10 PROCEDURE — 999N000141 HC STATISTIC PRE-PROCEDURE NURSING ASSESSMENT: Performed by: INTERNAL MEDICINE

## 2022-05-10 RX ORDER — ONDANSETRON 2 MG/ML
4 INJECTION INTRAMUSCULAR; INTRAVENOUS EVERY 30 MIN PRN
Status: DISCONTINUED | OUTPATIENT
Start: 2022-05-10 | End: 2022-05-10 | Stop reason: HOSPADM

## 2022-05-10 RX ORDER — PROPOFOL 10 MG/ML
INJECTION, EMULSION INTRAVENOUS PRN
Status: DISCONTINUED | OUTPATIENT
Start: 2022-05-10 | End: 2022-05-10

## 2022-05-10 RX ORDER — SODIUM CHLORIDE, SODIUM LACTATE, POTASSIUM CHLORIDE, CALCIUM CHLORIDE 600; 310; 30; 20 MG/100ML; MG/100ML; MG/100ML; MG/100ML
INJECTION, SOLUTION INTRAVENOUS CONTINUOUS
Status: DISCONTINUED | OUTPATIENT
Start: 2022-05-10 | End: 2022-05-10 | Stop reason: HOSPADM

## 2022-05-10 RX ORDER — ONDANSETRON 2 MG/ML
4 INJECTION INTRAMUSCULAR; INTRAVENOUS
Status: DISCONTINUED | OUTPATIENT
Start: 2022-05-10 | End: 2022-05-10 | Stop reason: HOSPADM

## 2022-05-10 RX ORDER — ONDANSETRON 4 MG/1
4 TABLET, ORALLY DISINTEGRATING ORAL EVERY 30 MIN PRN
Status: DISCONTINUED | OUTPATIENT
Start: 2022-05-10 | End: 2022-05-10 | Stop reason: HOSPADM

## 2022-05-10 RX ORDER — NALOXONE HYDROCHLORIDE 0.4 MG/ML
0.4 INJECTION, SOLUTION INTRAMUSCULAR; INTRAVENOUS; SUBCUTANEOUS
Status: DISCONTINUED | OUTPATIENT
Start: 2022-05-10 | End: 2022-05-10 | Stop reason: HOSPADM

## 2022-05-10 RX ORDER — NALOXONE HYDROCHLORIDE 0.4 MG/ML
0.2 INJECTION, SOLUTION INTRAMUSCULAR; INTRAVENOUS; SUBCUTANEOUS
Status: DISCONTINUED | OUTPATIENT
Start: 2022-05-10 | End: 2022-05-10 | Stop reason: HOSPADM

## 2022-05-10 RX ORDER — FENTANYL CITRATE 50 UG/ML
25 INJECTION, SOLUTION INTRAMUSCULAR; INTRAVENOUS EVERY 5 MIN PRN
Status: DISCONTINUED | OUTPATIENT
Start: 2022-05-10 | End: 2022-05-10 | Stop reason: HOSPADM

## 2022-05-10 RX ORDER — HALOPERIDOL 5 MG/ML
1 INJECTION INTRAMUSCULAR
Status: DISCONTINUED | OUTPATIENT
Start: 2022-05-10 | End: 2022-05-10 | Stop reason: HOSPADM

## 2022-05-10 RX ORDER — PROCHLORPERAZINE MALEATE 5 MG
5 TABLET ORAL EVERY 6 HOURS PRN
Status: DISCONTINUED | OUTPATIENT
Start: 2022-05-10 | End: 2022-05-10 | Stop reason: HOSPADM

## 2022-05-10 RX ORDER — ONDANSETRON 4 MG/1
4 TABLET, ORALLY DISINTEGRATING ORAL EVERY 6 HOURS PRN
Status: DISCONTINUED | OUTPATIENT
Start: 2022-05-10 | End: 2022-05-10 | Stop reason: HOSPADM

## 2022-05-10 RX ORDER — ONDANSETRON 2 MG/ML
4 INJECTION INTRAMUSCULAR; INTRAVENOUS EVERY 6 HOURS PRN
Status: DISCONTINUED | OUTPATIENT
Start: 2022-05-10 | End: 2022-05-10 | Stop reason: HOSPADM

## 2022-05-10 RX ORDER — LIDOCAINE 40 MG/G
CREAM TOPICAL
Status: DISCONTINUED | OUTPATIENT
Start: 2022-05-10 | End: 2022-05-10 | Stop reason: HOSPADM

## 2022-05-10 RX ORDER — FENTANYL CITRATE 50 UG/ML
25 INJECTION, SOLUTION INTRAMUSCULAR; INTRAVENOUS
Status: DISCONTINUED | OUTPATIENT
Start: 2022-05-10 | End: 2022-05-10 | Stop reason: HOSPADM

## 2022-05-10 RX ORDER — MEPERIDINE HYDROCHLORIDE 25 MG/ML
12.5 INJECTION INTRAMUSCULAR; INTRAVENOUS; SUBCUTANEOUS
Status: DISCONTINUED | OUTPATIENT
Start: 2022-05-10 | End: 2022-05-10 | Stop reason: HOSPADM

## 2022-05-10 RX ORDER — FLUMAZENIL 0.1 MG/ML
0.2 INJECTION, SOLUTION INTRAVENOUS
Status: DISCONTINUED | OUTPATIENT
Start: 2022-05-10 | End: 2022-05-10 | Stop reason: HOSPADM

## 2022-05-10 RX ADMIN — SODIUM CHLORIDE, POTASSIUM CHLORIDE, SODIUM LACTATE AND CALCIUM CHLORIDE: 600; 310; 30; 20 INJECTION, SOLUTION INTRAVENOUS at 10:23

## 2022-05-10 RX ADMIN — PROPOFOL 20 MG: 10 INJECTION, EMULSION INTRAVENOUS at 13:43

## 2022-05-10 RX ADMIN — ONDANSETRON 4 MG: 2 INJECTION INTRAMUSCULAR; INTRAVENOUS at 14:13

## 2022-05-10 RX ADMIN — PROPOFOL 30 MG: 10 INJECTION, EMULSION INTRAVENOUS at 13:51

## 2022-05-10 RX ADMIN — PROPOFOL 20 MG: 10 INJECTION, EMULSION INTRAVENOUS at 14:03

## 2022-05-10 RX ADMIN — PROPOFOL 150 MCG/KG/MIN: 10 INJECTION, EMULSION INTRAVENOUS at 13:42

## 2022-05-10 NOTE — ANESTHESIA CARE TRANSFER NOTE
Patient: Vasquez Ann    Procedure: Procedure(s):  ESOPHAGOGASTRODUODENOSCOPY WITH BIOPSIES  COLONOSCOPY       Diagnosis: Encounter for screening for malignant neoplasm of colon [Z12.11]  Gastroesophageal reflux disease without esophagitis [K21.9]  Diaphragmatic hernia without obstruction and without gangrene [K44.9]  Diagnosis Additional Information: No value filed.    Anesthesia Type:   MAC     Note:    Oropharynx: oropharynx clear of all foreign objects  Level of Consciousness: awake  Oxygen Supplementation: room air    Independent Airway: airway patency satisfactory and stable  Dentition: dentition unchanged  Vital Signs Stable: post-procedure vital signs reviewed and stable  Report to RN Given: handoff report given  Patient transferred to: Phase II    Handoff Report: Identifed the Patient, Identified the Reponsible Provider, Reviewed the pertinent medical history, Discussed the surgical course, Reviewed Intra-OP anesthesia mangement and issues during anesthesia, Set expectations for post-procedure period and Allowed opportunity for questions and acknowledgement of understanding      Vitals:  Vitals Value Taken Time   /66 05/10/22 1427   Temp 35.7  C (96.2  F) 05/10/22 1427   Pulse 83 05/10/22 1428   Resp     SpO2 100 % 05/10/22 1428   Vitals shown include unvalidated device data.    Electronically Signed By: MELONIE Cassidy CRNA  May 10, 2022  2:29 PM

## 2022-05-10 NOTE — ANESTHESIA POSTPROCEDURE EVALUATION
Patient: Vasquez Ann    Procedure: Procedure(s):  ESOPHAGOGASTRODUODENOSCOPY WITH BIOPSIES  COLONOSCOPY       Anesthesia Type:  MAC    Note:  Disposition: Outpatient   Postop Pain Control: Uneventful            Sign Out: Well controlled pain   PONV: No   Neuro/Psych: Uneventful            Sign Out: Acceptable/Baseline neuro status   Airway/Respiratory: Uneventful            Sign Out: Acceptable/Baseline resp. status   CV/Hemodynamics: Uneventful            Sign Out: Acceptable CV status; No obvious hypovolemia; No obvious fluid overload   Other NRE: NONE   DID A NON-ROUTINE EVENT OCCUR? No           Last vitals:  Vitals Value Taken Time   /62 05/10/22 1450   Temp 35.7  C (96.2  F) 05/10/22 1427   Pulse 68 05/10/22 1458   Resp 15 05/10/22 1427   SpO2 88 % 05/10/22 1458   Vitals shown include unvalidated device data.    Electronically Signed By: Juan José Sanches MD  May 10, 2022  3:05 PM

## 2022-05-10 NOTE — ANESTHESIA PREPROCEDURE EVALUATION
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     Skin cancer     squamous cell hands, arms and legs     Stroke (H)     h/o PFO      Past Surgical History:   Procedure Laterality Date     ABDOMEN SURGERY  3/2014    Incarcerated spigelian hernia recurence 2016     APPENDECTOMY  3/2014     APPENDECTOMY       CARDIAC SURGERY      PFO closure thru femoral artery     COLONOSCOPY  2014    Polyps     DENTAL SURGERY  1973    wisodm teeth     GI SURGERY      upper GI     HERNIA REPAIR  3/2014    Incarcerated spigelian hernia/ recurence 2016     HERNIA REPAIR       PATENT FORAMEN OVALE CLOSURE  2007      Allergies   Allergen Reactions     Clopidogrel Hives and Rash     Other reaction(s): Edema, hives, rash, abd pain, hives,rash  Plavix       Clopidogrel Bisulfate Hives and Swelling     Plavix     Nsaids      History of ulcers  Other reaction(s): vomiting      Social History     Tobacco Use     Smoking status: Former Smoker     Types: Cigarettes     Start date: 1977     Quit date: 2012     Years since quitting: 10.3     Smokeless tobacco: Never Used     Tobacco comment: Was a socailsmoker/ quit completely about 3 years ago   Substance Use Topics     Alcohol use: Yes     Comment: rare, 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      No history of anesthetic complications       ROS/MED HX  ENT/Pulmonary:  - neg pulmonary ROS     Neurologic:     (+) CVA, TIA,     Cardiovascular:     (+) Dyslipidemia -----    METS/Exercise Tolerance:     Hematologic:  - neg hematologic  ROS     Musculoskeletal: Comment: Myotonia NOS.      GI/Hepatic:     (+) GERD, hiatal hernia, bowel prep,     Renal/Genitourinary:       Endo:    (-) obesity   Psychiatric/Substance Use:     (+) psychiatric history anxiety and depression     Infectious Disease:       Malignancy:  - neg malignancy ROS     Other:  - neg other ROS          Physical Exam    Airway        Mallampati: II   TM distance: > 3 FB   Neck ROM: full     Respiratory Devices and Support         Dental  no notable dental history         Cardiovascular   cardiovascular exam normal          Pulmonary   pulmonary exam normal                OUTSIDE LABS:  CBC:   Lab Results   Component Value Date    WBC 13.5 (H) 04/05/2022    WBC 18.8 (H) 07/18/2019    HGB 15.2 04/05/2022    HGB 15.9 07/18/2019    HCT 45.0 04/05/2022    HCT 47.5 07/18/2019     04/05/2022     07/18/2019     BMP:   Lab Results   Component Value Date     04/05/2022     07/18/2019    POTASSIUM 4.9 04/05/2022    POTASSIUM 4.6 07/18/2019    CHLORIDE 107 04/05/2022    CHLORIDE 103 07/18/2019    CO2 26 04/05/2022    CO2 27 07/18/2019    BUN 14 04/05/2022    BUN 15 07/18/2019    CR 0.90 04/05/2022    CR 1.35 (H) 07/18/2019     04/05/2022     07/18/2019     COAGS:   Lab Results   Component Value Date    PTT 28 03/21/2005    INR 1.03 03/21/2005     POC: No results found for: BGM, HCG, HCGS  HEPATIC:   Lab Results   Component Value Date    ALBUMIN 3.8 04/05/2022    PROTTOTAL 7.3 04/05/2022    ALT 24 04/05/2022    AST 20 04/05/2022    ALKPHOS 106 04/05/2022    BILITOTAL 0.5 04/05/2022     OTHER:   Lab Results   Component Value Date    LACT 0.9 04/05/2022    A1C 5.6 04/12/2013    LINA 9.6 04/05/2022    LIPASE 28 04/05/2022     TSH 1.00 04/12/2013    CRP <5.0 07/16/2013    SED 4 04/12/2013       Anesthesia Plan    ASA Status:  2   NPO Status:  NPO Appropriate    Anesthesia Type: MAC.   Induction: Intravenous.   Maintenance: TIVA.        Consents    Anesthesia Plan(s) and associated risks, benefits, and realistic alternatives discussed. Questions answered and patient/representative(s) expressed understanding.    - Discussed:     - Discussed with:  Patient      - Extended Intubation/Ventilatory Support Discussed: No.      - Patient is DNR/DNI Status: No    Use of blood products discussed: No .     Postoperative Care    Pain management: Oral pain medications, Multi-modal analgesia.   PONV prophylaxis: Ondansetron (or other 5HT-3), Dexamethasone or Solumedrol     Comments:    Other Comments: Decadron, Zofran.  Diprivan infusion.  PE, Ephedrine PRN.            Juan José Sanches MD

## 2022-05-10 NOTE — PROGRESS NOTES
PRE-PROCEDURE NOTE      REASON FOR PROCEDURE: GERD, polyp surveillance, follow up diverticulitis    History and Physical: Reviewed, no changes    Pre-sedation Assessment:     VS: AVSS  General: Alert, NAD  Airway: normal  Heart: RRR  Lungs: CTA    Previous Reaction to Sedation: None    Sedation Plan Based on Assessment: MAC    Mallampati: II    ASA Classification: 2      Impression: Patient deemed adequate candidate for MAC sedation    Plan: esophagogastroduodenoscopy and colonoscopy              Bro Dupont MD  Thank you for the opportunity to participate in the care of this patient.   Please feel free to call me with any questions or concerns.  Phone number [phone].            5/10/2022 1:19 PM

## 2022-05-15 PROCEDURE — 88305 TISSUE EXAM BY PATHOLOGIST: CPT | Mod: 26 | Performed by: PATHOLOGY

## 2022-05-15 PROCEDURE — 88342 IMHCHEM/IMCYTCHM 1ST ANTB: CPT | Mod: 26 | Performed by: PATHOLOGY

## 2022-05-23 ENCOUNTER — DOCUMENTATION ONLY (OUTPATIENT)
Dept: OTHER | Facility: CLINIC | Age: 72
End: 2022-05-23
Payer: MEDICARE

## 2022-06-04 ENCOUNTER — HEALTH MAINTENANCE LETTER (OUTPATIENT)
Age: 72
End: 2022-06-04

## 2022-06-19 LAB
PATH REPORT.ADDENDUM SPEC: NORMAL
PATH REPORT.COMMENTS IMP SPEC: NORMAL
PATH REPORT.COMMENTS IMP SPEC: NORMAL
PATH REPORT.FINAL DX SPEC: NORMAL
PATH REPORT.GROSS SPEC: NORMAL
PATH REPORT.MICROSCOPIC SPEC OTHER STN: NORMAL
PATH REPORT.RELEVANT HX SPEC: NORMAL
PHOTO IMAGE: NORMAL

## 2022-07-05 ENCOUNTER — LAB REQUISITION (OUTPATIENT)
Dept: LAB | Facility: CLINIC | Age: 72
End: 2022-07-05
Payer: MEDICARE

## 2022-07-05 DIAGNOSIS — R10.9 UNSPECIFIED ABDOMINAL PAIN: ICD-10-CM

## 2022-07-05 PROCEDURE — 80053 COMPREHEN METABOLIC PANEL: CPT | Mod: ORL | Performed by: FAMILY MEDICINE

## 2022-07-06 LAB
ALBUMIN SERPL BCG-MCNC: 4.7 G/DL (ref 3.5–5.2)
ALP SERPL-CCNC: 122 U/L (ref 40–129)
ALT SERPL W P-5'-P-CCNC: 38 U/L (ref 10–50)
ANION GAP SERPL CALCULATED.3IONS-SCNC: 12 MMOL/L (ref 7–15)
AST SERPL W P-5'-P-CCNC: 27 U/L (ref 10–50)
BILIRUB SERPL-MCNC: 0.3 MG/DL
BUN SERPL-MCNC: 15.6 MG/DL (ref 8–23)
CALCIUM SERPL-MCNC: 9.8 MG/DL (ref 8.8–10.2)
CHLORIDE SERPL-SCNC: 102 MMOL/L (ref 98–107)
CREAT SERPL-MCNC: 0.96 MG/DL (ref 0.67–1.17)
DEPRECATED HCO3 PLAS-SCNC: 26 MMOL/L (ref 22–29)
GFR SERPL CREATININE-BSD FRML MDRD: 85 ML/MIN/1.73M2
GLUCOSE SERPL-MCNC: 95 MG/DL (ref 70–99)
POTASSIUM SERPL-SCNC: 4.9 MMOL/L (ref 3.4–5.3)
PROT SERPL-MCNC: 7 G/DL (ref 6.4–8.3)
SODIUM SERPL-SCNC: 140 MMOL/L (ref 136–145)

## 2022-08-03 ENCOUNTER — HOSPITAL ENCOUNTER (OUTPATIENT)
Dept: NUCLEAR MEDICINE | Facility: HOSPITAL | Age: 72
Discharge: HOME | End: 2022-08-03
Attending: FAMILY MEDICINE | Admitting: FAMILY MEDICINE
Payer: MEDICARE

## 2022-08-03 DIAGNOSIS — R10.9 ABDOMINAL PAIN, UNSPECIFIED ABDOMINAL LOCATION: ICD-10-CM

## 2022-08-03 PROCEDURE — 343N000001 HC RX 343: Performed by: FAMILY MEDICINE

## 2022-08-03 PROCEDURE — 78227 HEPATOBIL SYST IMAGE W/DRUG: CPT

## 2022-08-03 PROCEDURE — A9537 TC99M MEBROFENIN: HCPCS | Performed by: FAMILY MEDICINE

## 2022-08-03 RX ORDER — KIT FOR THE PREPARATION OF TECHNETIUM TC 99M MEBROFENIN 45 MG/10ML
7-9 INJECTION, POWDER, LYOPHILIZED, FOR SOLUTION INTRAVENOUS ONCE
Status: COMPLETED | OUTPATIENT
Start: 2022-08-03 | End: 2022-08-03

## 2022-08-03 RX ADMIN — MEBROFENIN 7.3 MILLICURIE: 45 INJECTION, POWDER, LYOPHILIZED, FOR SOLUTION INTRAVENOUS at 07:21

## 2022-08-22 ENCOUNTER — TRANSFERRED RECORDS (OUTPATIENT)
Dept: HEALTH INFORMATION MANAGEMENT | Facility: CLINIC | Age: 72
End: 2022-08-22

## 2022-08-23 ENCOUNTER — TRANSFERRED RECORDS (OUTPATIENT)
Dept: HEALTH INFORMATION MANAGEMENT | Facility: CLINIC | Age: 72
End: 2022-08-23

## 2022-10-03 ENCOUNTER — OFFICE VISIT (OUTPATIENT)
Dept: SURGERY | Facility: CLINIC | Age: 72
End: 2022-10-03
Payer: MEDICARE

## 2022-10-03 VITALS — WEIGHT: 130 LBS | BODY MASS INDEX: 22.31 KG/M2

## 2022-10-03 DIAGNOSIS — K44.9 HIATAL HERNIA: Primary | ICD-10-CM

## 2022-10-03 PROCEDURE — 99204 OFFICE O/P NEW MOD 45 MIN: CPT | Performed by: SURGERY

## 2022-10-03 RX ORDER — PANTOPRAZOLE SODIUM 40 MG/1
TABLET, DELAYED RELEASE ORAL
COMMUNITY
Start: 2022-05-23 | End: 2023-05-01

## 2022-10-03 NOTE — PROGRESS NOTES
HPI: Vasquez Ann is a 72 year old male referred to see me by Enrrique Reeves for evaluation of gastroesophageal reflux disease.  He notes  a several year history of gastroesophageal reflux disease.  He states he has a 40-year history of reflux described as intermittent regurgitation with coughing at night as well as esophageal burning.  He was diagnosed with this approximately 40 years ago and states that the symptoms have been relatively well controlled on PPIs.  However, he does sleep with a wedge to prop his head up at night.  He has a longstanding history of a hiatal hernia which was noted on his endoscopies.  He has presented to the emergency room in the past for chest pain which was ruled out for cardiac etiology.  He does have intermittent discomfort in the retrosternal area after eating.  Denies any overt right upper quadrant discomfort but does have mid back discomfort.  For this reason he underwent a HIDA scan which showed a gallbladder ejection fraction of 39%.    Allergies:Clopidogrel, Clopidogrel bisulfate, and Nsaids    Past Medical History:   Diagnosis Date     Abdominal pain 1/3/2017     Anxiety     h/o panic attacks     Cerebral infarction (H) 6/20/2012     Depression     resolved     Depressive disorder 2004     Diverticulosis     frequent diverticulitis     Diverticulosis of colon - frequent diverticulitis 12/12/2013     GERD (gastroesophageal reflux disease) 11/9/2013     H/O TIA (transient ischemic attack) and stroke 1/3/2017     Hiatal hernia 6/20/2012     History of stroke with residual effects 2004    TIA / Testing Confirmed Frontal Lobe Impairment     Hyperlipidemia with target LDL less than 100 12/12/2013    Diagnosis updated by automated process. Provider to review and confirm.     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     Skin cancer     squamous cell hands, arms and legs     Stroke (H) 2004    h/o PFO       Past Surgical History:   Procedure Laterality Date     ABDOMEN SURGERY  3/2014    Incarcerated spigelian hernia recurence 12/30/2016     APPENDECTOMY  3/2014     APPENDECTOMY       CARDIAC SURGERY  2007    PFO closure thru femoral artery     COLONOSCOPY  2011/ 2014    Polyps     COLONOSCOPY N/A 5/10/2022    Procedure: COLONOSCOPY;  Surgeon: Bro Dupont MD, MD;  Location: [facility] Main OR     DENTAL SURGERY  1973    wisodm teeth     ESOPHAGOSCOPY, GASTROSCOPY, DUODENOSCOPY (EGD), COMBINED N/A 5/10/2022    Procedure: ESOPHAGOGASTRODUODENOSCOPY WITH BIOPSIES;  Surgeon: Bro Dupont MD, MD;  Location: [facility] Main OR     GI SURGERY  2010/2014    upper GI     HERNIA REPAIR  3/2014    Incarcerated spigelian hernia/ recurence 12/30/2016     HERNIA REPAIR       PATENT FORAMEN OVALE CLOSURE  07/2007       CURRENT MEDS:    Current Outpatient Medications:      aspirin 81 MG tablet, Take 1 tablet by mouth daily., Disp: , Rfl:      atorvastatin (LIPITOR) 20 MG tablet, Take 1 tablet (20 mg) by mouth daily (Patient taking differently: Take 20 mg by mouth as needed), Disp: 90 tablet, Rfl: 4     cholecalciferol 25 MCG (1000 UT) TABS, Take 1,000 Units by mouth daily, Disp: , Rfl:      clonazePAM (KLONOPIN) 0.5 MG tablet, as needed, Disp: , Rfl:      co-enzyme Q-10 100 MG CAPS capsule, Take 100 mg by mouth daily, Disp: , Rfl:      DENTA 5000 PLUS 1.1 % CREA, 2 times daily, Disp: , Rfl:      famotidine (PEPCID) 20 MG tablet, Take 20 mg by mouth 2 times daily, Disp: , Rfl:      pantoprazole (PROTONIX) 40 MG EC tablet, take 1 tablet by mouth twice daily (before breakfast and before dinner), Disp: , Rfl:     Family History    Problem Relation Age of Onset     Cerebrovascular Disease Mother         stroke     Cancer Father         Lung Juan Pablo 1993        reports that he quit smoking about 10 years ago. His smoking use included cigarettes. He started smoking about 45 years ago. He has never used smokeless tobacco. He reports previous alcohol use. He reports that he does not use drugs.    Review of Systems:  The 12 system review is within normal limits except for as mentioned above.  General ROS: No complaints or constitutional symptoms  Ophthalmic ROS: No complaints of visual changes  Skin: No complaints or symptoms   Endocrine: No complaints or symptoms  Hematologic/Lymphatic: No symptoms or complaints  Psychiatric: No symptoms or complaints  Respiratory ROS: no cough, shortness of breath, or wheezing  Cardiovascular ROS: no chest pain or dyspnea on exertion  Gastrointestinal ROS: As per HPI  Genito-Urinary ROS: no dysuria, trouble voiding, or hematuria  Musculoskeletal ROS: no joint or muscle pain  Neurological ROS: no TIA or stroke symptoms      EXAM:  Wt 59 kg (130 lb)   BMI 22.31 kg/m    GENERAL: Well developed male, No acute distress, pleasant and conversant   EYES: Pupils equal, round and reactive, no scleral icterus  ABDOMEN: Soft, non-tender, no masses, non-distended  SKIN: Pink, warm and dry, no obvious rashes or lesions   NEURO:No focal deficits, ambulatory  MUSCULOSKELETAL:No obvious deformities, no swelling, normal appearing      LABS:  Lab Results   Component Value Date    WBC 13.5 04/05/2022    WBC 8.9 10/01/2013    HGB 15.2 04/05/2022    HGB 15.5 10/01/2013    HCT 45.0 04/05/2022    HCT 45.0 10/01/2013    MCV 92 04/05/2022    MCV 94 10/01/2013     04/05/2022     10/01/2013     INR/Prothrombin Time  @LABRCNTIP(NA,K,CL,co2,bun,creatinine,labglom,glucose,calcium)@  Lab Results   Component Value Date    ALT 38 07/05/2022    AST 27 07/05/2022    ALKPHOS 122 07/05/2022       IMAGES:   Relevant images were reviewed  and discussed with the patient.  Notable findings were: CT images reviewed with the patient which demonstrate a paraesophageal hernia    Assessment/Plan:   Vasquez Ann is a 72 year old male with signs and symptoms consistent with a paraesophageal hernia and gastroesophageal reflux disease.  I have explained the pathophysiology of paraesophageal hernias in detail as well as the surgical versus non-operative management strategies.  His gallbladder ejection fraction is 39% and I am not convinced that this is the causative factor in his symptomatology.  His endoscopy did not demonstrate any Gonzales's metaplasia or any other cellular changes indicative of premalignant change.  However, his hiatal hernia has progressed to a paraesophageal hernia in evolution which is likely the causative factor for his retrosternal chest discomfort after eating and his symptoms.  I discussed this at length with him including possible management strategies.    At this point we will proceed with continued initial work-up.  This will include an esophagram for better characterization of his esophageal motility as well as the paraesophageal hernia.  Once complete I will call him with the results and we can discuss options moving forward.    Vasquez Blackwood,  [facility]  [phone]  [facility] Department of Surgery

## 2022-10-03 NOTE — LETTER
10/3/2022         RE: Vasquez Ann  [address]  [address]  [place] [address]        Dear Colleague,    Thank you for referring your patient, Vasquez Ann, to the [facility] [place]. Please see a copy of my visit note below.        HPI: Vasquez Ann is a 72 year old male referred to see me by Enrrique Reeves for evaluation of gastroesophageal reflux disease.  He notes  a several year history of gastroesophageal reflux disease.  He states he has a 40-year history of reflux described as intermittent regurgitation with coughing at night as well as esophageal burning.  He was diagnosed with this approximately 40 years ago and states that the symptoms have been relatively well controlled on PPIs.  However, he does sleep with a wedge to prop his head up at night.  He has a longstanding history of a hiatal hernia which was noted on his endoscopies.  He has presented to the emergency room in the past for chest pain which was ruled out for cardiac etiology.  He does have intermittent discomfort in the retrosternal area after eating.  Denies any overt right upper quadrant discomfort but does have mid back discomfort.  For this reason he underwent a HIDA scan which showed a gallbladder ejection fraction of 39%.    Allergies:Clopidogrel, Clopidogrel bisulfate, and Nsaids    Past Medical History:   Diagnosis Date     Abdominal pain 1/3/2017     Anxiety     h/o panic attacks     Cerebral infarction (H) 6/20/2012     Depression     resolved     Depressive disorder 2004     Diverticulosis     frequent diverticulitis     Diverticulosis of colon - frequent diverticulitis 12/12/2013     GERD (gastroesophageal reflux disease) 11/9/2013     H/O TIA (transient ischemic attack) and stroke 1/3/2017     Hiatal hernia 6/20/2012     History of stroke with residual effects 2004    TIA / Testing Confirmed Frontal Lobe Impairment     Hyperlipidemia with target  LDL less than 100 12/12/2013    Diagnosis updated by automated process. Provider to review and confirm.     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     Skin cancer     squamous cell hands, arms and legs     Stroke (H) 2004    h/o PFO       Past Surgical History:   Procedure Laterality Date     ABDOMEN SURGERY  3/2014    Incarcerated spigelian hernia recurence 12/30/2016     APPENDECTOMY  3/2014     APPENDECTOMY       CARDIAC SURGERY  2007    PFO closure thru femoral artery     COLONOSCOPY  2011/ 2014    Polyps     COLONOSCOPY N/A 5/10/2022    Procedure: COLONOSCOPY;  Surgeon: Bro Dupont MD, MD;  Location: [facility] Main OR     DENTAL SURGERY  1973    wisod teeth     ESOPHAGOSCOPY, GASTROSCOPY, DUODENOSCOPY (EGD), COMBINED N/A 5/10/2022    Procedure: ESOPHAGOGASTRODUODENOSCOPY WITH BIOPSIES;  Surgeon: Bro Dupont MD, MD;  Location: [facility] Main OR     GI SURGERY  2010/2014    upper GI     HERNIA REPAIR  3/2014    Incarcerated spigelian hernia/ recurence 12/30/2016     HERNIA REPAIR       PATENT FORAMEN OVALE CLOSURE  07/2007       CURRENT MEDS:    Current Outpatient Medications:      aspirin 81 MG tablet, Take 1 tablet by mouth daily., Disp: , Rfl:      atorvastatin (LIPITOR) 20 MG tablet, Take 1 tablet (20 mg) by mouth daily (Patient taking differently: Take 20 mg by mouth as needed), Disp: 90 tablet, Rfl: 4     cholecalciferol 25 MCG (1000 UT) TABS, Take 1,000 Units by mouth daily, Disp: , Rfl:      clonazePAM (KLONOPIN) 0.5 MG tablet, as needed, Disp: , Rfl:      co-enzyme Q-10 100 MG CAPS capsule, Take 100 mg by mouth daily, Disp: , Rfl:       DENTA 5000 PLUS 1.1 % CREA, 2 times daily, Disp: , Rfl:      famotidine (PEPCID) 20 MG tablet, Take 20 mg by mouth 2 times daily, Disp: , Rfl:      pantoprazole (PROTONIX) 40 MG EC tablet, take 1 tablet by mouth twice daily (before breakfast and before dinner), Disp: , Rfl:     Family History   Problem Relation Age of Onset     Cerebrovascular Disease Mother         stroke     Cancer Father         Lung Caner 1993        reports that he quit smoking about 10 years ago. His smoking use included cigarettes. He started smoking about 45 years ago. He has never used smokeless tobacco. He reports previous alcohol use. He reports that he does not use drugs.    Review of Systems:  The 12 system review is within normal limits except for as mentioned above.  General ROS: No complaints or constitutional symptoms  Ophthalmic ROS: No complaints of visual changes  Skin: No complaints or symptoms   Endocrine: No complaints or symptoms  Hematologic/Lymphatic: No symptoms or complaints  Psychiatric: No symptoms or complaints  Respiratory ROS: no cough, shortness of breath, or wheezing  Cardiovascular ROS: no chest pain or dyspnea on exertion  Gastrointestinal ROS: As per HPI  Genito-Urinary ROS: no dysuria, trouble voiding, or hematuria  Musculoskeletal ROS: no joint or muscle pain  Neurological ROS: no TIA or stroke symptoms      EXAM:  Wt 59 kg (130 lb)   BMI 22.31 kg/m    GENERAL: Well developed male, No acute distress, pleasant and conversant   EYES: Pupils equal, round and reactive, no scleral icterus  ABDOMEN: Soft, non-tender, no masses, non-distended  SKIN: Pink, warm and dry, no obvious rashes or lesions   NEURO:No focal deficits, ambulatory  MUSCULOSKELETAL:No obvious deformities, no swelling, normal appearing      LABS:  Lab Results   Component Value Date    WBC 13.5 04/05/2022    WBC 8.9 10/01/2013    HGB 15.2 04/05/2022    HGB 15.5 10/01/2013    HCT 45.0 04/05/2022    HCT 45.0 10/01/2013    MCV 92 04/05/2022     MCV 94 10/01/2013     04/05/2022     10/01/2013     INR/Prothrombin Time  @LABRCNTIP(NA,K,CL,co2,bun,creatinine,labglom,glucose,calcium)@  Lab Results   Component Value Date    ALT 38 07/05/2022    AST 27 07/05/2022    ALKPHOS 122 07/05/2022       IMAGES:   Relevant images were reviewed and discussed with the patient.  Notable findings were: CT images reviewed with the patient which demonstrate a paraesophageal hernia    Assessment/Plan:   Vasquez Ann is a 72 year old male with signs and symptoms consistent with a paraesophageal hernia and gastroesophageal reflux disease.  I have explained the pathophysiology of paraesophageal hernias in detail as well as the surgical versus non-operative management strategies.  His gallbladder ejection fraction is 39% and I am not convinced that this is the causative factor in his symptomatology.  His endoscopy did not demonstrate any Gonzales's metaplasia or any other cellular changes indicative of premalignant change.  However, his hiatal hernia has progressed to a paraesophageal hernia in evolution which is likely the causative factor for his retrosternal chest discomfort after eating and his symptoms.  I discussed this at length with him including possible management strategies.    At this point we will proceed with continued initial work-up.  This will include an esophagram for better characterization of his esophageal motility as well as the paraesophageal hernia.  Once complete I will call him with the results and we can discuss options moving forward.    Vasquez Blackwood DO [facility]  [phone]  [facility] Department of Surgery      Again, thank you for allowing me to participate in the care of your patient.        Sincerely,        Vasquez Blackwood DO

## 2022-10-13 ENCOUNTER — HOSPITAL ENCOUNTER (OUTPATIENT)
Dept: RADIOLOGY | Facility: HOSPITAL | Age: 72
Discharge: HOME | End: 2022-10-13
Attending: SURGERY | Admitting: SURGERY
Payer: MEDICARE

## 2022-10-13 DIAGNOSIS — K44.9 HIATAL HERNIA: ICD-10-CM

## 2022-10-13 PROCEDURE — 255N000001 HC RX 255: Performed by: SURGERY

## 2022-10-13 PROCEDURE — 74220 X-RAY XM ESOPHAGUS 1CNTRST: CPT

## 2022-10-13 RX ADMIN — ANTACID/ANTIFLATULENT 4 G: 380; 550; 10; 10 GRANULE, EFFERVESCENT ORAL at 08:59

## 2022-10-17 ENCOUNTER — VIRTUAL VISIT (OUTPATIENT)
Dept: SURGERY | Facility: CLINIC | Age: 72
End: 2022-10-17
Payer: MEDICARE

## 2022-10-17 DIAGNOSIS — K21.9 GASTROESOPHAGEAL REFLUX DISEASE WITHOUT ESOPHAGITIS: Primary | Chronic | ICD-10-CM

## 2022-10-17 NOTE — LETTER
10/17/2022         RE: Vasquez Ann  [address]  [address]  [place] [address]        Dear Colleague,    Thank you for referring your patient, Vasquez Ann, to the [facility] SURG[facility] CL[facility] AND BARIATRICS [facility]. Please see a copy of my visit note below.    Telephone note :    I discussed the findings of the esophagram with the patient.  He continues to have severe reflux as noted on the esophagram.  He may have a sliding component of a hiatal hernia as well.  After discussion he will consider his options and will follow up with me if he would like any further work-up or intervention.        Duran Blackwood DO [facility] Surgery  [phone]        Again, thank you for allowing me to participate in the care of your patient.        Sincerely,        Vasquez Blackwood, DO

## 2022-10-17 NOTE — PROGRESS NOTES
Telephone note :    I discussed the findings of the esophagram with the patient.  He continues to have severe reflux as noted on the esophagram.  He may have a sliding component of a hiatal hernia as well.  After discussion he will consider his options and will follow up with me if he would like any further work-up or intervention.        Duran Blackwood DO [facility] Surgery  [phone]

## 2022-11-28 ENCOUNTER — VIRTUAL VISIT (OUTPATIENT)
Dept: SURGERY | Facility: CLINIC | Age: 72
End: 2022-11-28
Payer: MEDICARE

## 2022-11-28 DIAGNOSIS — K44.9 HIATAL HERNIA: Primary | ICD-10-CM

## 2022-11-28 PROCEDURE — 99442 PR PHYSICIAN TELEPHONE EVALUATION 11-20 MIN: CPT | Performed by: SURGERY

## 2022-11-28 NOTE — PROGRESS NOTES
"  The patient has been notified of following:     \"This telephone visit will be conducted via a call between you and your physician/provider. We have found that certain health care needs can be provided without the need for a physical exam.  This service lets us provide the care you need with a short phone conversation.  If a prescription is necessary we can send it directly to your pharmacy.  If lab work is needed we can place an order for that and you can then stop by our lab to have the test done at a later time.    Telephone visits are billed at different rates depending on your insurance coverage. During this emergency period, for some insurers they may be billed the same as an in-person visit.  Please reach out to your insurance provider with any questions.    If during the course of the call the physician/provider feels a telephone visit is not appropriate, you will not be charged for this service.\"    Patient has given verbal consent to a Telephone visit? Yes    What phone number would you like to be contacted at? [phone]    Patient would like to receive their AVS by [employer]    Are there any specific questions or needs that you would like addressed at your visit today? Discuss nissen surg        Distant Location (provider location):  On-site    Additional provider notes: I had another discussion with Duran regarding his overall clinical picture of his hiatal hernia and his abdominal discomfort.  He states that he had a repeat HIDA scan which showed a normal ejection fraction of his gallbladder.  At this point he would like to pursue surgical options to correct his reflux.  We had a discussion regarding surgical options and risks and benefits.  At this point I will have our  reach out to him to schedule surgery.  He is happy with the plan moving forward and will call in the clinic if he has any further questions or concerns.    Phone call duration: 20 minutes    Duran Blackwood DO [facility] " Surgery  [phone]

## 2022-11-28 NOTE — LETTER
"    11/28/2022         RE: Vasquez Ann  [address]  [address]  [place] [facility] [place] [address]        Dear Colleague,    Thank you for referring your patient, Vasquez Ann, to the [facility] SURGERY CLINIC AND [facility]IATRICS CARE [place]. Please see a copy of my visit note below.      The patient has been notified of following:     \"This telephone visit will be conducted via a call between you and your physician/provider. We have found that certain health care needs can be provided without the need for a physical exam.  This service lets us provide the care you need with a short phone conversation.  If a prescription is necessary we can send it directly to your pharmacy.  If lab work is needed we can place an order for that and you can then stop by our lab to have the test done at a later time.    Telephone visits are billed at different rates depending on your insurance coverage. During this emergency period, for some insurers they may be billed the same as an in-person visit.  Please reach out to your insurance provider with any questions.    If during the course of the call the physician/provider feels a telephone visit is not appropriate, you will not be charged for this service.\"    Patient has given verbal consent to a Telephone visit? Yes    What phone number would you like to be contacted at? [phone]    Patient would like to receive their AVS by [employer]    Are there any specific questions or needs that you would like addressed at your visit today? Discuss nissen surg        Distant Location (provider location):  On-site    Additional provider notes: I had another discussion with Duran regarding his overall clinical picture of his hiatal hernia and his abdominal discomfort.  He states that he had a repeat HIDA scan which showed a normal ejection fraction of his gallbladder.  At this point he would like to pursue surgical options to correct his reflux.  We had a discussion regarding " surgical options and risks and benefits.  At this point I will have our  reach out to him to schedule surgery.  He is happy with the plan moving forward and will call in the clinic if he has any further questions or concerns.    Phone call duration: 20 minutes    Duran Blackwood DO [facility] Surgery  [phone]          Again, thank you for allowing me to participate in the care of your patient.        Sincerely,        Vasquez Blackwood, DO

## 2022-11-30 ENCOUNTER — PREP FOR PROCEDURE (OUTPATIENT)
Dept: SURGERY | Facility: CLINIC | Age: 72
End: 2022-11-30

## 2022-11-30 DIAGNOSIS — K44.9 HIATAL HERNIA: Primary | ICD-10-CM

## 2022-12-02 ENCOUNTER — TELEPHONE (OUTPATIENT)
Dept: SURGERY | Facility: CLINIC | Age: 72
End: 2022-12-02

## 2022-12-02 NOTE — TELEPHONE ENCOUNTER
Spoke with patient today regarding surgery scheduling     Went over details/instructions.    Surgery Letter sent via [employer]

## 2022-12-02 NOTE — LETTER
We've received instruction to get you scheduled for surgery with Dr Blackwood. We have that arranged as follows:     Pre-op Physical:  Call your primary clinic to schedule.    Surgery Date: 5/4/2023     Location: [facility].  [address] [place] [address]    Approximate Arrival Time: 6:00 am  (Unless instructed differently by the pre-op call nurse)     Post op Appointment: 5/22/2023 at 11:40 am  [facility] [address], [place] [address].    Prep Tasks and Info:     1. Schedule a pre-op physical with your primary care doctor within 30 days of surgery. This is required by anesthesia and if not done, your surgery will be cancelled. Call them asap to get this scheduled.    2. Review your medications with your primary care or prescribing physician; they will advise you which meds to stop and when, and when you can resume taking.  Certain medications like blood thinners need to be stopped in advance of surgery to proceed safely.    3. Please shower the evening before and morning of surgery with Hibiclens or Exidine soap.  This can be found at your local pharmacy.    4. Fasting instructions will be provided by the pre-op nurse who will call you 1-3 days before surgery.  Typically we advise normal food up to 8 hours before surgery then clear liquids only up to 1 hour before surgery then nothing at all by mouth for 2 hours including no gum or candy.  The nurse will review your specific instructions with you at the call.      5. Smoking impacts your body's ability to heal properly.  If you are a smoker, we strongly urge you to stop smoking 4-6 weeks before surgery. Plastic surgery patients are required to be nicotine free for 6-8 weeks before surgery.     6. You will need an adult to drive you home and stay with you 24 hours after surgery. Public transportation or Medical Van Services are not permitted.    7. You may have two  visitors wait in the lobby at the surgery center during your surgery. Visitor restrictions are subject to change, please verify with the pre-op nurse when they call.    8. If the community sees a new COVID19 surge, your procedure may need to be postponed. We will contact you if this happens. You will be screened for high-risk exposure to Covid-19 during the pre-op call.  We encourage you to quarantine yourself away from any Covid-19 people for 10 days before surgery to avoid possible last minute cancellations.   When you arrive to the surgery center, you will again be screened for COVID19 symptoms. If you screen positive, your surgery will need to be postponed.    9. We always encourage you to notify your insurance any time you have medical tests or procedures scheduled including surgery. The number is usually right on the back of your insurance card. Please call [facility] Cost of Care at [phone] if you'd like a surgery quote.       Call our office if you have any questions! Thank you!

## 2023-02-09 ENCOUNTER — MYC MEDICAL ADVICE (OUTPATIENT)
Dept: SURGERY | Facility: CLINIC | Age: 73
End: 2023-02-09
Payer: MEDICARE

## 2023-02-10 ENCOUNTER — LAB REQUISITION (OUTPATIENT)
Dept: LAB | Facility: CLINIC | Age: 73
End: 2023-02-10
Payer: MEDICARE

## 2023-02-10 DIAGNOSIS — E55.9 VITAMIN D DEFICIENCY, UNSPECIFIED: ICD-10-CM

## 2023-02-10 DIAGNOSIS — Z12.5 ENCOUNTER FOR SCREENING FOR MALIGNANT NEOPLASM OF PROSTATE: ICD-10-CM

## 2023-02-10 DIAGNOSIS — R63.4 ABNORMAL WEIGHT LOSS: ICD-10-CM

## 2023-02-10 DIAGNOSIS — E78.5 HYPERLIPIDEMIA, UNSPECIFIED: ICD-10-CM

## 2023-02-10 LAB
ALBUMIN SERPL BCG-MCNC: 4.6 G/DL (ref 3.5–5.2)
ALP SERPL-CCNC: 96 U/L (ref 40–129)
ALT SERPL W P-5'-P-CCNC: 28 U/L (ref 10–50)
ANION GAP SERPL CALCULATED.3IONS-SCNC: 11 MMOL/L (ref 7–15)
AST SERPL W P-5'-P-CCNC: 24 U/L (ref 10–50)
BILIRUB SERPL-MCNC: 0.6 MG/DL
BUN SERPL-MCNC: 16.2 MG/DL (ref 8–23)
CALCIUM SERPL-MCNC: 10 MG/DL (ref 8.8–10.2)
CHLORIDE SERPL-SCNC: 104 MMOL/L (ref 98–107)
CHOLEST SERPL-MCNC: 159 MG/DL
CREAT SERPL-MCNC: 0.91 MG/DL (ref 0.67–1.17)
DEPRECATED HCO3 PLAS-SCNC: 27 MMOL/L (ref 22–29)
GFR SERPL CREATININE-BSD FRML MDRD: 90 ML/MIN/1.73M2
GLUCOSE SERPL-MCNC: 113 MG/DL (ref 70–99)
HDLC SERPL-MCNC: 60 MG/DL
LDLC SERPL CALC-MCNC: 79 MG/DL
NONHDLC SERPL-MCNC: 99 MG/DL
POTASSIUM SERPL-SCNC: 5.1 MMOL/L (ref 3.4–5.3)
PROT SERPL-MCNC: 6.9 G/DL (ref 6.4–8.3)
PSA SERPL-MCNC: 0.75 NG/ML (ref 0–6.5)
SODIUM SERPL-SCNC: 142 MMOL/L (ref 136–145)
TRIGL SERPL-MCNC: 101 MG/DL
TSH SERPL DL<=0.005 MIU/L-ACNC: 1.14 UIU/ML (ref 0.3–4.2)

## 2023-02-10 PROCEDURE — G0103 PSA SCREENING: HCPCS | Mod: ORL | Performed by: FAMILY MEDICINE

## 2023-02-10 PROCEDURE — 84443 ASSAY THYROID STIM HORMONE: CPT | Mod: ORL | Performed by: FAMILY MEDICINE

## 2023-02-10 PROCEDURE — 80061 LIPID PANEL: CPT | Mod: ORL | Performed by: FAMILY MEDICINE

## 2023-02-10 PROCEDURE — 80053 COMPREHEN METABOLIC PANEL: CPT | Mod: ORL | Performed by: FAMILY MEDICINE

## 2023-02-10 PROCEDURE — 82306 VITAMIN D 25 HYDROXY: CPT | Mod: ORL | Performed by: FAMILY MEDICINE

## 2023-02-13 LAB — DEPRECATED CALCIDIOL+CALCIFEROL SERPL-MC: >155 UG/L (ref 20–75)

## 2023-02-15 NOTE — TELEPHONE ENCOUNTER
Received motility test from [facility]. Followed up with pt per My Chart and let him know this would be sent to Dr. Blackwood to review. He is scheduled for surgery in May. Encouraged him to follow up closer to that date with any additional pre-op questions.       [facility]     Belkis Foster  RN, BSN  [facility]  General Surgery  [facility] [address]  [email or web address]   Office:[phone]  Employed by [facility],              '

## 2023-02-20 ENCOUNTER — OFFICE VISIT (OUTPATIENT)
Dept: NEUROLOGY | Facility: CLINIC | Age: 73
End: 2023-02-20
Payer: MEDICARE

## 2023-02-20 VITALS
BODY MASS INDEX: 21.77 KG/M2 | DIASTOLIC BLOOD PRESSURE: 90 MMHG | HEART RATE: 82 BPM | SYSTOLIC BLOOD PRESSURE: 135 MMHG | HEIGHT: 64 IN | OXYGEN SATURATION: 99 % | WEIGHT: 127.5 LBS

## 2023-02-20 DIAGNOSIS — M54.6 ACUTE MIDLINE THORACIC BACK PAIN: ICD-10-CM

## 2023-02-20 DIAGNOSIS — M48.02 SPINAL STENOSIS OF CERVICAL REGION WITH RADICULOPATHY: Primary | ICD-10-CM

## 2023-02-20 DIAGNOSIS — M54.12 SPINAL STENOSIS OF CERVICAL REGION WITH RADICULOPATHY: Primary | ICD-10-CM

## 2023-02-20 DIAGNOSIS — G71.19 PARAMYOTONIA CONGENITA: ICD-10-CM

## 2023-02-20 PROCEDURE — 99214 OFFICE O/P EST MOD 30 MIN: CPT | Performed by: PSYCHIATRY & NEUROLOGY

## 2023-02-20 RX ORDER — GABAPENTIN 100 MG/1
100 CAPSULE ORAL 3 TIMES DAILY
COMMUNITY
End: 2023-04-07

## 2023-02-20 RX ORDER — DULOXETIN HYDROCHLORIDE 20 MG/1
20 CAPSULE, DELAYED RELEASE ORAL DAILY
COMMUNITY
Start: 2023-02-06

## 2023-02-20 ASSESSMENT — PAIN SCALES - GENERAL: PAINLEVEL: MILD PAIN (2)

## 2023-02-20 NOTE — LETTER
2023       RE: Vasquez Ann  [address]  [address]  [place] [address]     Dear Colleague,    Thank you for referring your patient, Vasquez Ann, to the [facility] NEUROLOGY CLINIC [place] [facility]. Please see a copy of my visit note below.    Service Date: 2023    Enrrique Reeves MD  [facility]  [address] [place]  [address]    RE:  Vasquez Ann  MRN:  [id number]  :  1950    Dear Dr. Reeves:    I had the pleasure to see Mr. Ann in followup at the [facility].  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  I last saw him a year ago.  He spent  rotating between GI physicians because of chronic problems including abdominal pain.  He has slow transit constipation.  He also has major gastroesophageal reflux.  It is controlled with Protonix, but ultimately, the decision was made to proceed with Nissen fundoplication surgery in 2023.      His cramps in the legs related to myotonia are quite better.  He still gets episodes of abdominal pain and spasming, and I tried gabapentin again last year at 300 mg t.i.d, yet it did not work very well for this symptom.  He then went off the gabapentin for a while.  His newest concern is pain or discomfort in the thoracic region of his back that he is experiencing over the last month.  It is bilateral and sometimes the paresthesias extend toward the midline.  It is a burning or electric sensation. It does not extend circumferentially to the anterior chest. It is worse when he is sitting or after taking a long walk.  It does bother him at night but quite less.  There is no clear Lhermitte sign.  This burning sensation does not radiate towards the lower back, buttocks and feet.  There is no gait instability.  No new  numbness or tingling in hands and feet.  No new arm or leg weakness reported.  No loss of bowel or bladder control.  He does have known chronic cervical radiculopathy from spinal stenosis.  The last C spine MRI was done in 12/2021 showing multilevel foraminal stenosis, moderate to severe, but no spinal cord compression.  He has not had thoracic spine imaging before.  Otherwise, nothing has changed from last year.     Medications were reviewed and are as per Epic record.    On exam, he is awake, alert, oriented x3.  He strength is 5/5 in upper and lower extremities proximally and distally.  His reflexes are 2+ on the right biceps, 1+ on the left.  Triceps reflexes are 3+ bilaterally, brachioradialis 2+.  Knee and ankle reflexes are 2 to 3+ but symmetric.  There is no ankle clonus.  Toes are clearly downgoing.  Agility of finger and foot tapping is normal on both sides.  There is no leg spasticity.  His vibration is low normal at the toes, about 8-9 on the right and 7 on the left, and about 13 seconds at the index fingers.  Danna sign is negative.  Examination of the thoracic paraspinal region does not yield any pathological muscle rigidity or contraction. Pinprick and light touch sensation are intact in all thoracic dermatomes. There is no right versus left difference.  There is no sensory level.    In summary, Mr. Ann is doing overall well with his paramyotonia congenita.  He will have a Nissen fundoplication for his intractable GERD later this year.  What bothers him most is the new paresthesias or burning pain in the thoracic region.  It is reassuring that it is NOT accompanied by significant objective neurological abnormalities, but given the lack of an obvious explanation for this symptom, I would like him to get a repeat cervical and a new thoracic spine MRI. I will review the results with him.  If there is any evidence of myelopathy, this definitely will have to be addressed before he gets Nissen  fundoplication surgery. If there is no evidence of myelopathy, we will manage conservatively.  He can continue gabapentin and I would like to refer him to our Pain Clinic with consideration of trigger point injections.  Followup in 1 year or sooner if needed.    Billing is [facility] level 4, moderate, based on:  1.  Problems assessed: Two stable chronic conditions, paramyotonia congenita and thoracic back pain.  2.  Risk: prescription drug management (on gabapentin for management of neuropathic pain and myotonia).    Sincerely,    Glenn Decker MD        D: 2023   T: 2023   MT: monica    Name:     LANG OLIVAS  MRN:      [id number]        Account:      [id number]   :      1950           Service Date: 2023       Document: [id number]

## 2023-02-20 NOTE — PATIENT INSTRUCTIONS
Recommend repeating the MRI of your cervical and also checking thoracic spine for the first time  Referral to our Pain Clinic for consideration of trigger point injection- after the MRIs are done    Follow up 1 year

## 2023-02-20 NOTE — PROGRESS NOTES
Service Date: 2023    Enrrique Reeves MD  [facility]  [address] [id number]    RE:  Vasquez Ann  MRN:  [id number]  :  1950    Dear Dr. Reeves:    I had the pleasure to see Mr. Ann in followup at the [facility].  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  I last saw him a year ago.  He spent  rotating between GI physicians because of chronic problems including abdominal pain.  He has slow transit constipation.  He also has major gastroesophageal reflux.  It is controlled with Protonix, but ultimately, the decision was made to proceed with Nissen fundoplication surgery in 2023.      His cramps in the legs related to myotonia are quite better.  He still gets episodes of abdominal pain and spasming, and I tried gabapentin again last year at 300 mg t.i.d, yet it did not work very well for this symptom.  He then went off the gabapentin for a while.  His newest concern is pain or discomfort in the thoracic region of his back that he is experiencing over the last month.  It is bilateral and sometimes the paresthesias extend toward the midline.  It is a burning or electric sensation. It does not extend circumferentially to the anterior chest. It is worse when he is sitting or after taking a long walk.  It does bother him at night but quite less.  There is no clear Lhermitte sign.  This burning sensation does not radiate towards the lower back, buttocks and feet.  There is no gait instability.  No new numbness or tingling in hands and feet.  No new arm or leg weakness reported.  No loss of bowel or bladder control.  He does have known chronic cervical radiculopathy from spinal stenosis.  The last C spine MRI was done in 2021 showing multilevel foraminal stenosis, moderate to severe, but no spinal cord compression.  He has not had  thoracic spine imaging before.  Otherwise, nothing has changed from last year.     Medications were reviewed and are as per Epic record.    On exam, he is awake, alert, oriented x3.  He strength is 5/5 in upper and lower extremities proximally and distally.  His reflexes are 2+ on the right biceps, 1+ on the left.  Triceps reflexes are 3+ bilaterally, brachioradialis 2+.  Knee and ankle reflexes are 2 to 3+ but symmetric.  There is no ankle clonus.  Toes are clearly downgoing.  Agility of finger and foot tapping is normal on both sides.  There is no leg spasticity.  His vibration is low normal at the toes, about 8-9 on the right and 7 on the left, and about 13 seconds at the index fingers.  Danna sign is negative.  Examination of the thoracic paraspinal region does not yield any pathological muscle rigidity or contraction. Pinprick and light touch sensation are intact in all thoracic dermatomes. There is no right versus left difference.  There is no sensory level.    In summary, Mr. Ann is doing overall well with his paramyotonia congenita.  He will have a Nissen fundoplication for his intractable GERD later this year.  What bothers him most is the new paresthesias or burning pain in the thoracic region.  It is reassuring that it is NOT accompanied by significant objective neurological abnormalities, but given the lack of an obvious explanation for this symptom, I would like him to get a repeat cervical and a new thoracic spine MRI. I will review the results with him.  If there is any evidence of myelopathy, this definitely will have to be addressed before he gets Nissen fundoplication surgery. If there is no evidence of myelopathy, we will manage conservatively.  He can continue gabapentin and I would like to refer him to our Pain Clinic with consideration of trigger point injections.  Followup in 1 year or sooner if needed.    Billing is [facility] level 4, moderate, based on:  1.  Problems assessed: Two  stable chronic conditions, paramyotonia congenita and thoracic back pain.  2.  Risk: prescription drug management (on gabapentin for management of neuropathic pain and myotonia).    Sincerely,    Glenn Decker MD        D: 2023   T: 2023   MT: monica    Name:     LANG OLIVAS  MRN:      [id number]        Account:      [id number]   :      1950           Service Date: 2023       Document: [id number]

## 2023-02-24 ENCOUNTER — HOSPITAL ENCOUNTER (OUTPATIENT)
Dept: MRI IMAGING | Facility: HOSPITAL | Age: 73
Discharge: HOME | End: 2023-02-24
Attending: PSYCHIATRY & NEUROLOGY
Payer: MEDICARE

## 2023-02-24 DIAGNOSIS — M48.02 SPINAL STENOSIS OF CERVICAL REGION WITH RADICULOPATHY: ICD-10-CM

## 2023-02-24 DIAGNOSIS — M54.6 ACUTE MIDLINE THORACIC BACK PAIN: ICD-10-CM

## 2023-02-24 DIAGNOSIS — M54.12 SPINAL STENOSIS OF CERVICAL REGION WITH RADICULOPATHY: ICD-10-CM

## 2023-02-24 PROCEDURE — 255N000002 HC RX 255 OP 636: Performed by: PSYCHIATRY & NEUROLOGY

## 2023-02-24 PROCEDURE — G1010 CDSM STANSON: HCPCS

## 2023-02-24 PROCEDURE — 72141 MRI NECK SPINE W/O DYE: CPT | Mod: MF

## 2023-02-24 PROCEDURE — A9585 GADOBUTROL INJECTION: HCPCS | Performed by: PSYCHIATRY & NEUROLOGY

## 2023-02-24 RX ORDER — GADOBUTROL 604.72 MG/ML
0.1 INJECTION INTRAVENOUS ONCE
Status: COMPLETED | OUTPATIENT
Start: 2023-02-24 | End: 2023-02-24

## 2023-02-24 RX ADMIN — GADOBUTROL 6 ML: 604.72 INJECTION INTRAVENOUS at 09:24

## 2023-03-24 ENCOUNTER — TELEPHONE (OUTPATIENT)
Dept: SURGERY | Facility: CLINIC | Age: 73
End: 2023-03-24
Payer: MEDICARE

## 2023-03-24 NOTE — TELEPHONE ENCOUNTER
Left message for Duran to call me back.     Planning to offer him the date of 4/10 [facility]

## 2023-03-24 NOTE — TELEPHONE ENCOUNTER
Duran called back and we discussed moving his surgery up to 4/10 with Dr. Blackwood. He is going to call his PCP and see if he can get in there for the pre op physical before 4/10 and then give me a call back.     he also noted that he had been talking with Belkis regarding an MRI he wanted Dr. Blackwood to look at. He said that he really just thought it would be of help to Dr. Blackwood, not that there was any new concerns on it he wanted to discuss.

## 2023-03-24 NOTE — TELEPHONE ENCOUNTER
Duran called back and stated he was able to get in for a pre op with his PCP on 3/29. We have moved his surgery up to 4/10 with Dr. Blackwood at [facility].     New letter created and sent via [email or web address]

## 2023-03-24 NOTE — LETTER
We've received instruction to get you scheduled for surgery with Dr Blackwood. We have that arranged as follows:     Pre-op Physical:  Call your primary clinic to schedule.    Surgery Date: 4/10/2023    Location: [facility].  [facility] [place] [address]    Approximate Arrival Time: 11:00 am (Unless instructed differently by the pre-op call nurse)     Post op Appointment: 5/1/2023 at 9:00 am at   [facility] [facility] [address], [place] [address].    Prep Tasks and Info:     1. Schedule a pre-op physical with your primary care doctor within 30 days of surgery. This is required by anesthesia and if not done, your surgery will be cancelled. Call them asap to get this scheduled.    2. Review your medications with your primary care or prescribing physician; they will advise you which meds to stop and when, and when you can resume taking.  Certain medications like blood thinners need to be stopped in advance of surgery to proceed safely.    3. Please shower the evening before and morning of surgery with Hibiclens or Exidine soap.  This can be found at your local pharmacy.    4. Fasting instructions will be provided by the pre-op nurse who will call you 1-3 days before surgery.  Typically we advise normal food up to 8 hours before surgery then clear liquids only up to 1 hour before surgery then nothing at all by mouth for 2 hours including no gum or candy.  The nurse will review your specific instructions with you at the call.      5. Smoking impacts your body's ability to heal properly.  If you are a smoker, we strongly urge you to stop smoking 4-6 weeks before surgery. Plastic surgery patients are required to be nicotine free for 6-8 weeks before surgery.     6. You will need an adult to drive you home and stay with you 24 hours after surgery. Public transportation or Medical Van Services are not permitted.    7. You may have two  visitors wait in the lobby at the surgery center during your surgery. Visitor restrictions are subject to change, please verify with the pre-op nurse when they call.    8. If the community sees a new COVID19 surge, your procedure may need to be postponed. We will contact you if this happens. You will be screened for high-risk exposure to Covid-19 during the pre-op call.  We encourage you to quarantine yourself away from any Covid-19 people for 10 days before surgery to avoid possible last minute cancellations.   When you arrive to the surgery center, you will again be screened for COVID19 symptoms. If you screen positive, your surgery will need to be postponed.    9. We always encourage you to notify your insurance any time you have medical tests or procedures scheduled including surgery. The number is usually right on the back of your insurance card. Please call [facility] Cost of Care at [phone] if you'd like a surgery quote.       Call our office if you have any questions! Thank you!

## 2023-03-29 ENCOUNTER — TRANSFERRED RECORDS (OUTPATIENT)
Dept: HEALTH INFORMATION MANAGEMENT | Facility: CLINIC | Age: 73
End: 2023-03-29

## 2023-03-29 ENCOUNTER — LAB REQUISITION (OUTPATIENT)
Dept: LAB | Facility: CLINIC | Age: 73
End: 2023-03-29
Payer: MEDICARE

## 2023-03-29 DIAGNOSIS — Z01.818 ENCOUNTER FOR OTHER PREPROCEDURAL EXAMINATION: ICD-10-CM

## 2023-03-29 DIAGNOSIS — E55.9 VITAMIN D DEFICIENCY, UNSPECIFIED: ICD-10-CM

## 2023-03-29 LAB
ANION GAP SERPL CALCULATED.3IONS-SCNC: 15 MMOL/L (ref 7–15)
BUN SERPL-MCNC: 16.7 MG/DL (ref 8–23)
CALCIUM SERPL-MCNC: 9.8 MG/DL (ref 8.8–10.2)
CHLORIDE SERPL-SCNC: 104 MMOL/L (ref 98–107)
CREAT SERPL-MCNC: 0.84 MG/DL (ref 0.67–1.17)
DEPRECATED CALCIDIOL+CALCIFEROL SERPL-MC: 139 UG/L (ref 20–75)
DEPRECATED HCO3 PLAS-SCNC: 24 MMOL/L (ref 22–29)
GFR SERPL CREATININE-BSD FRML MDRD: >90 ML/MIN/1.73M2
GLUCOSE SERPL-MCNC: 103 MG/DL (ref 70–99)
POTASSIUM SERPL-SCNC: 5 MMOL/L (ref 3.4–5.3)
SODIUM SERPL-SCNC: 143 MMOL/L (ref 136–145)

## 2023-03-29 PROCEDURE — 82306 VITAMIN D 25 HYDROXY: CPT | Mod: ORL | Performed by: STUDENT IN AN ORGANIZED HEALTH CARE EDUCATION/TRAINING PROGRAM

## 2023-03-29 PROCEDURE — 80048 BASIC METABOLIC PNL TOTAL CA: CPT | Mod: ORL | Performed by: STUDENT IN AN ORGANIZED HEALTH CARE EDUCATION/TRAINING PROGRAM

## 2023-04-06 ENCOUNTER — TELEPHONE (OUTPATIENT)
Dept: SURGERY | Facility: CLINIC | Age: 73
End: 2023-04-06
Payer: MEDICARE

## 2023-04-06 NOTE — TELEPHONE ENCOUNTER
Patient is having surgery Monday he has a rash that broke out on this chest last night it is red and bumpy only in the chest area no fever or other symptoms, he wants to make sure he can still have surgery on Monday.  He cannot get into his primary before surgery.    Please call and advise.    Thanks!

## 2023-04-06 NOTE — TELEPHONE ENCOUNTER
Spoke to patient. He developed a red raised rash on his chest area. No itching. It does not bother him. He did want to let Dr. Blackwood know; he is scheduled for a partial fundoplication surgery on Monday. I told him he can take some benadryl and if it gets worse, he should see his PCP. Will also make sure Dr. Blackwood is aware of this.       [facility]      Heather Napoles RN  [facility]  General Surgery  [facility] [address]  [email or web address]   Office:[phone]  Employed by [facility] [employer] [facility],

## 2023-04-07 RX ORDER — POLYETHYLENE GLYCOL 3350 17 G/17G
1 POWDER, FOR SOLUTION ORAL DAILY
COMMUNITY
End: 2023-05-01

## 2023-04-10 ENCOUNTER — ANESTHESIA (OUTPATIENT)
Dept: SURGERY | Facility: HOSPITAL | Age: 73
End: 2023-04-10
Payer: MEDICARE

## 2023-04-10 ENCOUNTER — ANESTHESIA EVENT (OUTPATIENT)
Dept: SURGERY | Facility: HOSPITAL | Age: 73
End: 2023-04-10
Payer: MEDICARE

## 2023-04-10 ENCOUNTER — HOSPITAL ENCOUNTER (OUTPATIENT)
Facility: HOSPITAL | Age: 73
Discharge: HOME | End: 2023-04-11
Attending: SURGERY | Admitting: SURGERY
Payer: MEDICARE

## 2023-04-10 DIAGNOSIS — K44.9 HIATAL HERNIA: Primary | ICD-10-CM

## 2023-04-10 PROBLEM — Z98.890 S/P REPAIR OF PARAESOPHAGEAL HERNIA: Status: ACTIVE | Noted: 2023-04-10

## 2023-04-10 PROBLEM — Z87.19 S/P REPAIR OF PARAESOPHAGEAL HERNIA: Status: ACTIVE | Noted: 2023-04-10

## 2023-04-10 LAB
CREAT SERPL-MCNC: 0.89 MG/DL (ref 0.67–1.17)
GFR SERPL CREATININE-BSD FRML MDRD: >90 ML/MIN/1.73M2

## 2023-04-10 PROCEDURE — 710N000009 HC RECOVERY PHASE 1, LEVEL 1, PER MIN: Performed by: SURGERY

## 2023-04-10 PROCEDURE — 250N000011 HC RX IP 250 OP 636: Performed by: ANESTHESIOLOGY

## 2023-04-10 PROCEDURE — 258N000003 HC RX IP 258 OP 636: Performed by: ANESTHESIOLOGY

## 2023-04-10 PROCEDURE — 258N000003 HC RX IP 258 OP 636: Performed by: NURSE ANESTHETIST, CERTIFIED REGISTERED

## 2023-04-10 PROCEDURE — 250N000011 HC RX IP 250 OP 636: Performed by: SURGERY

## 2023-04-10 PROCEDURE — 370N000017 HC ANESTHESIA TECHNICAL FEE, PER MIN: Performed by: SURGERY

## 2023-04-10 PROCEDURE — C9290 INJ, BUPIVACAINE LIPOSOME: HCPCS | Performed by: SURGERY

## 2023-04-10 PROCEDURE — 43282 LAP PARAESOPH HER RPR W/MESH: CPT | Performed by: SURGERY

## 2023-04-10 PROCEDURE — 360N000080 HC SURGERY LEVEL 7, PER MIN: Performed by: SURGERY

## 2023-04-10 PROCEDURE — 250N000013 HC RX MED GY IP 250 OP 250 PS 637: Performed by: ANESTHESIOLOGY

## 2023-04-10 PROCEDURE — S2900 ROBOTIC SURGICAL SYSTEM: HCPCS | Performed by: SURGERY

## 2023-04-10 PROCEDURE — 82565 ASSAY OF CREATININE: CPT | Performed by: SURGERY

## 2023-04-10 PROCEDURE — 250N000009 HC RX 250: Performed by: NURSE ANESTHETIST, CERTIFIED REGISTERED

## 2023-04-10 PROCEDURE — 250N000011 HC RX IP 250 OP 636: Performed by: NURSE ANESTHETIST, CERTIFIED REGISTERED

## 2023-04-10 PROCEDURE — 272N000001 HC OR GENERAL SUPPLY STERILE: Performed by: SURGERY

## 2023-04-10 PROCEDURE — 999N000141 HC STATISTIC PRE-PROCEDURE NURSING ASSESSMENT: Performed by: SURGERY

## 2023-04-10 PROCEDURE — 250N000013 HC RX MED GY IP 250 OP 250 PS 637: Performed by: SURGERY

## 2023-04-10 PROCEDURE — 250N000009 HC RX 250: Performed by: SURGERY

## 2023-04-10 PROCEDURE — 36415 COLL VENOUS BLD VENIPUNCTURE: CPT | Performed by: SURGERY

## 2023-04-10 PROCEDURE — C1781 MESH (IMPLANTABLE): HCPCS | Performed by: SURGERY

## 2023-04-10 DEVICE — BIO-A TISSUE REINFORCEMENT 7CMX10CM
Type: IMPLANTABLE DEVICE | Site: ABDOMEN | Status: FUNCTIONAL
Brand: GORE BIO-A TISSUE REINFORCEMENT

## 2023-04-10 RX ORDER — HYDROMORPHONE HCL IN WATER/PF 6 MG/30 ML
0.2 PATIENT CONTROLLED ANALGESIA SYRINGE INTRAVENOUS
Status: DISCONTINUED | OUTPATIENT
Start: 2023-04-10 | End: 2023-04-11 | Stop reason: HOSPADM

## 2023-04-10 RX ORDER — FENTANYL CITRATE 50 UG/ML
25 INJECTION, SOLUTION INTRAMUSCULAR; INTRAVENOUS EVERY 5 MIN PRN
Status: DISCONTINUED | OUTPATIENT
Start: 2023-04-10 | End: 2023-04-10 | Stop reason: HOSPADM

## 2023-04-10 RX ORDER — DEXMEDETOMIDINE HYDROCHLORIDE 4 UG/ML
.2-1 INJECTION, SOLUTION INTRAVENOUS CONTINUOUS
Status: DISCONTINUED | OUTPATIENT
Start: 2023-04-10 | End: 2023-04-10 | Stop reason: HOSPADM

## 2023-04-10 RX ORDER — ACETAMINOPHEN 500 MG
1000 TABLET ORAL DAILY PRN
COMMUNITY

## 2023-04-10 RX ORDER — AMOXICILLIN 250 MG
1 CAPSULE ORAL 2 TIMES DAILY
Status: DISCONTINUED | OUTPATIENT
Start: 2023-04-10 | End: 2023-04-11 | Stop reason: HOSPADM

## 2023-04-10 RX ORDER — MAGNESIUM SULFATE HEPTAHYDRATE 40 MG/ML
2 INJECTION, SOLUTION INTRAVENOUS ONCE
Status: COMPLETED | OUTPATIENT
Start: 2023-04-10 | End: 2023-04-10

## 2023-04-10 RX ORDER — HYDROMORPHONE HYDROCHLORIDE 1 MG/ML
0.2 INJECTION, SOLUTION INTRAMUSCULAR; INTRAVENOUS; SUBCUTANEOUS EVERY 5 MIN PRN
Status: DISCONTINUED | OUTPATIENT
Start: 2023-04-10 | End: 2023-04-10 | Stop reason: HOSPADM

## 2023-04-10 RX ORDER — ACETAMINOPHEN 325 MG/1
975 TABLET ORAL ONCE
Status: COMPLETED | OUTPATIENT
Start: 2023-04-10 | End: 2023-04-10

## 2023-04-10 RX ORDER — KETAMINE HYDROCHLORIDE 10 MG/ML
INJECTION INTRAMUSCULAR; INTRAVENOUS
Status: COMPLETED
Start: 2023-04-10 | End: 2023-04-10

## 2023-04-10 RX ORDER — FENTANYL CITRATE 50 UG/ML
INJECTION, SOLUTION INTRAMUSCULAR; INTRAVENOUS PRN
Status: DISCONTINUED | OUTPATIENT
Start: 2023-04-10 | End: 2023-04-10

## 2023-04-10 RX ORDER — DULOXETIN HYDROCHLORIDE 20 MG/1
20 CAPSULE, DELAYED RELEASE ORAL DAILY
Status: DISCONTINUED | OUTPATIENT
Start: 2023-04-11 | End: 2023-04-11 | Stop reason: HOSPADM

## 2023-04-10 RX ORDER — HYDROMORPHONE HYDROCHLORIDE 1 MG/ML
0.4 INJECTION, SOLUTION INTRAMUSCULAR; INTRAVENOUS; SUBCUTANEOUS EVERY 5 MIN PRN
Status: DISCONTINUED | OUTPATIENT
Start: 2023-04-10 | End: 2023-04-10 | Stop reason: HOSPADM

## 2023-04-10 RX ORDER — ONDANSETRON 2 MG/ML
4 INJECTION INTRAMUSCULAR; INTRAVENOUS EVERY 30 MIN PRN
Status: DISCONTINUED | OUTPATIENT
Start: 2023-04-10 | End: 2023-04-10 | Stop reason: HOSPADM

## 2023-04-10 RX ORDER — ONDANSETRON 4 MG/1
4 TABLET, ORALLY DISINTEGRATING ORAL EVERY 30 MIN PRN
Status: DISCONTINUED | OUTPATIENT
Start: 2023-04-10 | End: 2023-04-10 | Stop reason: HOSPADM

## 2023-04-10 RX ORDER — NALOXONE HYDROCHLORIDE 1 MG/ML
0.4 INJECTION INTRAMUSCULAR; INTRAVENOUS; SUBCUTANEOUS
Status: DISCONTINUED | OUTPATIENT
Start: 2023-04-10 | End: 2023-04-11 | Stop reason: HOSPADM

## 2023-04-10 RX ORDER — DEXAMETHASONE SODIUM PHOSPHATE 10 MG/ML
INJECTION, SOLUTION INTRAMUSCULAR; INTRAVENOUS PRN
Status: DISCONTINUED | OUTPATIENT
Start: 2023-04-10 | End: 2023-04-10

## 2023-04-10 RX ORDER — SODIUM CHLORIDE, SODIUM LACTATE, POTASSIUM CHLORIDE, CALCIUM CHLORIDE 600; 310; 30; 20 MG/100ML; MG/100ML; MG/100ML; MG/100ML
INJECTION, SOLUTION INTRAVENOUS CONTINUOUS
Status: DISCONTINUED | OUTPATIENT
Start: 2023-04-10 | End: 2023-04-10 | Stop reason: HOSPADM

## 2023-04-10 RX ORDER — PROCHLORPERAZINE MALEATE 5 MG
5 TABLET ORAL EVERY 6 HOURS PRN
Status: DISCONTINUED | OUTPATIENT
Start: 2023-04-10 | End: 2023-04-11 | Stop reason: HOSPADM

## 2023-04-10 RX ORDER — ONDANSETRON 2 MG/ML
4 INJECTION INTRAMUSCULAR; INTRAVENOUS EVERY 6 HOURS PRN
Status: DISCONTINUED | OUTPATIENT
Start: 2023-04-10 | End: 2023-04-11 | Stop reason: HOSPADM

## 2023-04-10 RX ORDER — GABAPENTIN 100 MG/1
100 CAPSULE ORAL AT BEDTIME
Status: DISCONTINUED | OUTPATIENT
Start: 2023-04-10 | End: 2023-04-11 | Stop reason: HOSPADM

## 2023-04-10 RX ORDER — ATORVASTATIN CALCIUM 10 MG/1
20 TABLET, FILM COATED ORAL DAILY
Status: DISCONTINUED | OUTPATIENT
Start: 2023-04-11 | End: 2023-04-11 | Stop reason: HOSPADM

## 2023-04-10 RX ORDER — FENTANYL CITRATE 50 UG/ML
50 INJECTION, SOLUTION INTRAMUSCULAR; INTRAVENOUS EVERY 5 MIN PRN
Status: DISCONTINUED | OUTPATIENT
Start: 2023-04-10 | End: 2023-04-10 | Stop reason: HOSPADM

## 2023-04-10 RX ORDER — NALOXONE HYDROCHLORIDE 1 MG/ML
0.2 INJECTION INTRAMUSCULAR; INTRAVENOUS; SUBCUTANEOUS
Status: DISCONTINUED | OUTPATIENT
Start: 2023-04-10 | End: 2023-04-11 | Stop reason: HOSPADM

## 2023-04-10 RX ORDER — LIDOCAINE 40 MG/G
CREAM TOPICAL
Status: DISCONTINUED | OUTPATIENT
Start: 2023-04-10 | End: 2023-04-10 | Stop reason: HOSPADM

## 2023-04-10 RX ORDER — PROPOFOL 10 MG/ML
INJECTION, EMULSION INTRAVENOUS PRN
Status: DISCONTINUED | OUTPATIENT
Start: 2023-04-10 | End: 2023-04-10

## 2023-04-10 RX ORDER — KETAMINE HYDROCHLORIDE 10 MG/ML
INJECTION INTRAMUSCULAR; INTRAVENOUS PRN
Status: DISCONTINUED | OUTPATIENT
Start: 2023-04-10 | End: 2023-04-10

## 2023-04-10 RX ORDER — ACETAMINOPHEN 325 MG/1
975 TABLET ORAL EVERY 8 HOURS
Status: DISCONTINUED | OUTPATIENT
Start: 2023-04-10 | End: 2023-04-11 | Stop reason: HOSPADM

## 2023-04-10 RX ORDER — POLYETHYLENE GLYCOL 3350 17 G/17G
17 POWDER, FOR SOLUTION ORAL DAILY
Status: DISCONTINUED | OUTPATIENT
Start: 2023-04-11 | End: 2023-04-11 | Stop reason: HOSPADM

## 2023-04-10 RX ORDER — PROPOFOL 10 MG/ML
INJECTION, EMULSION INTRAVENOUS CONTINUOUS PRN
Status: DISCONTINUED | OUTPATIENT
Start: 2023-04-10 | End: 2023-04-10

## 2023-04-10 RX ORDER — BISACODYL 10 MG
10 SUPPOSITORY, RECTAL RECTAL DAILY PRN
Status: DISCONTINUED | OUTPATIENT
Start: 2023-04-10 | End: 2023-04-11 | Stop reason: HOSPADM

## 2023-04-10 RX ORDER — ACETAMINOPHEN 325 MG/1
650 TABLET ORAL EVERY 4 HOURS PRN
Status: DISCONTINUED | OUTPATIENT
Start: 2023-04-13 | End: 2023-04-11 | Stop reason: HOSPADM

## 2023-04-10 RX ORDER — ONDANSETRON 2 MG/ML
INJECTION INTRAMUSCULAR; INTRAVENOUS PRN
Status: DISCONTINUED | OUTPATIENT
Start: 2023-04-10 | End: 2023-04-10

## 2023-04-10 RX ORDER — LIDOCAINE 40 MG/G
CREAM TOPICAL
Status: DISCONTINUED | OUTPATIENT
Start: 2023-04-10 | End: 2023-04-11 | Stop reason: HOSPADM

## 2023-04-10 RX ORDER — CLONAZEPAM 0.5 MG/1
0.5 TABLET ORAL DAILY PRN
Status: DISCONTINUED | OUTPATIENT
Start: 2023-04-10 | End: 2023-04-11 | Stop reason: HOSPADM

## 2023-04-10 RX ORDER — LIDOCAINE HYDROCHLORIDE 10 MG/ML
INJECTION, SOLUTION INFILTRATION; PERINEURAL PRN
Status: DISCONTINUED | OUTPATIENT
Start: 2023-04-10 | End: 2023-04-10

## 2023-04-10 RX ORDER — GLYCOPYRROLATE 0.2 MG/ML
INJECTION, SOLUTION INTRAMUSCULAR; INTRAVENOUS PRN
Status: DISCONTINUED | OUTPATIENT
Start: 2023-04-10 | End: 2023-04-10

## 2023-04-10 RX ORDER — ENOXAPARIN SODIUM 100 MG/ML
40 INJECTION SUBCUTANEOUS EVERY 24 HOURS
Status: DISCONTINUED | OUTPATIENT
Start: 2023-04-11 | End: 2023-04-11 | Stop reason: HOSPADM

## 2023-04-10 RX ORDER — ONDANSETRON 4 MG/1
4 TABLET, ORALLY DISINTEGRATING ORAL EVERY 6 HOURS PRN
Status: DISCONTINUED | OUTPATIENT
Start: 2023-04-10 | End: 2023-04-11 | Stop reason: HOSPADM

## 2023-04-10 RX ORDER — HYDROMORPHONE HCL IN WATER/PF 6 MG/30 ML
0.4 PATIENT CONTROLLED ANALGESIA SYRINGE INTRAVENOUS
Status: DISCONTINUED | OUTPATIENT
Start: 2023-04-10 | End: 2023-04-11 | Stop reason: HOSPADM

## 2023-04-10 RX ORDER — TRAMADOL HYDROCHLORIDE 50 MG/1
50 TABLET ORAL EVERY 6 HOURS PRN
Status: DISCONTINUED | OUTPATIENT
Start: 2023-04-10 | End: 2023-04-11 | Stop reason: HOSPADM

## 2023-04-10 RX ADMIN — ROCURONIUM BROMIDE 50 MG: 50 INJECTION, SOLUTION INTRAVENOUS at 13:40

## 2023-04-10 RX ADMIN — PHENYLEPHRINE HYDROCHLORIDE 200 MCG: 10 INJECTION INTRAVENOUS at 13:59

## 2023-04-10 RX ADMIN — SODIUM CHLORIDE, POTASSIUM CHLORIDE, SODIUM LACTATE AND CALCIUM CHLORIDE: 600; 310; 30; 20 INJECTION, SOLUTION INTRAVENOUS at 12:46

## 2023-04-10 RX ADMIN — LIDOCAINE HYDROCHLORIDE 50 MG: 10 INJECTION, SOLUTION INFILTRATION; PERINEURAL at 13:39

## 2023-04-10 RX ADMIN — DEXAMETHASONE SODIUM PHOSPHATE 10 MG: 10 INJECTION, SOLUTION INTRAMUSCULAR; INTRAVENOUS at 13:40

## 2023-04-10 RX ADMIN — KETAMINE HYDROCHLORIDE 10 MG: 10 INJECTION, SOLUTION INTRAMUSCULAR; INTRAVENOUS at 14:55

## 2023-04-10 RX ADMIN — MAGNESIUM SULFATE HEPTAHYDRATE 2 G: 40 INJECTION, SOLUTION INTRAVENOUS at 13:55

## 2023-04-10 RX ADMIN — PROPOFOL 170 MG: 10 INJECTION, EMULSION INTRAVENOUS at 13:39

## 2023-04-10 RX ADMIN — KETAMINE HYDROCHLORIDE 10 MG: 10 INJECTION, SOLUTION INTRAMUSCULAR; INTRAVENOUS at 14:15

## 2023-04-10 RX ADMIN — SODIUM CHLORIDE, POTASSIUM CHLORIDE, SODIUM LACTATE AND CALCIUM CHLORIDE: 600; 310; 30; 20 INJECTION, SOLUTION INTRAVENOUS at 14:21

## 2023-04-10 RX ADMIN — HYDROMORPHONE HYDROCHLORIDE 0.5 MG: 1 INJECTION, SOLUTION INTRAMUSCULAR; INTRAVENOUS; SUBCUTANEOUS at 14:39

## 2023-04-10 RX ADMIN — ONDANSETRON 4 MG: 2 INJECTION INTRAMUSCULAR; INTRAVENOUS at 15:29

## 2023-04-10 RX ADMIN — GABAPENTIN 100 MG: 100 CAPSULE ORAL at 20:51

## 2023-04-10 RX ADMIN — ACETAMINOPHEN 975 MG: 325 TABLET ORAL at 18:12

## 2023-04-10 RX ADMIN — MIDAZOLAM 2 MG: 1 INJECTION INTRAMUSCULAR; INTRAVENOUS at 13:29

## 2023-04-10 RX ADMIN — KETAMINE HYDROCHLORIDE 20 MG: 10 INJECTION, SOLUTION INTRAMUSCULAR; INTRAVENOUS at 13:54

## 2023-04-10 RX ADMIN — PHENYLEPHRINE HYDROCHLORIDE 100 MCG: 10 INJECTION INTRAVENOUS at 13:50

## 2023-04-10 RX ADMIN — PROPOFOL 200 MCG/KG/MIN: 10 INJECTION, EMULSION INTRAVENOUS at 13:42

## 2023-04-10 RX ADMIN — FENTANYL CITRATE 100 MCG: 50 INJECTION, SOLUTION INTRAMUSCULAR; INTRAVENOUS at 13:39

## 2023-04-10 RX ADMIN — ACETAMINOPHEN 975 MG: 325 TABLET ORAL at 12:20

## 2023-04-10 RX ADMIN — PHENYLEPHRINE HYDROCHLORIDE 0.5 MCG/KG/MIN: 10 INJECTION INTRAVENOUS at 14:06

## 2023-04-10 RX ADMIN — SUGAMMADEX 200 MG: 100 INJECTION, SOLUTION INTRAVENOUS at 15:43

## 2023-04-10 RX ADMIN — HYDROMORPHONE HYDROCHLORIDE 0.5 MG: 1 INJECTION, SOLUTION INTRAMUSCULAR; INTRAVENOUS; SUBCUTANEOUS at 15:18

## 2023-04-10 RX ADMIN — KETAMINE HYDROCHLORIDE 10 MG: 10 INJECTION, SOLUTION INTRAMUSCULAR; INTRAVENOUS at 14:39

## 2023-04-10 RX ADMIN — GLYCOPYRROLATE 0.2 MG: 0.2 INJECTION INTRAMUSCULAR; INTRAVENOUS at 13:39

## 2023-04-10 RX ADMIN — ROCURONIUM BROMIDE 30 MG: 50 INJECTION, SOLUTION INTRAVENOUS at 14:54

## 2023-04-10 ASSESSMENT — ACTIVITIES OF DAILY LIVING (ADL)
ADLS_ACUITY_SCORE: 18

## 2023-04-10 NOTE — INTERVAL H&P NOTE
I have reviewed the surgical (or preoperative) H&P that is linked to this encounter, and examined the patient. There are no significant changes    Plan for Procedure(s):  FUNDOPLICATION, partial, ROBOT-ASSISTED, LAPAROSCOPIC, USING DA RAMÓN XI     Duran Blackwood [facility] Surgery  [phone]

## 2023-04-10 NOTE — ANESTHESIA PREPROCEDURE EVALUATION
Anesthesia Pre-Procedure Evaluation    Patient: Vasquez Ann   MRN: [id number] : 1950        Procedure : Procedure(s):  FUNDOPLICATION, partial, ROBOT-ASSISTED, LAPAROSCOPIC, USING DA RAMÓN XI          Past Medical History:   Diagnosis Date     Abdominal pain 2017     Anxiety     h/o panic attacks     Cerebral infarction (H) 2012     Depression     resolved     Depressive disorder      Diverticulosis     frequent diverticulitis     Diverticulosis of colon - frequent diverticulitis 2013     GERD (gastroesophageal reflux disease) 2013     H/O TIA (transient ischemic attack) and stroke 2017     Hiatal hernia 2012     History of stroke with residual effects     TIA / Testing Confirmed Frontal Lobe Impairment     Hyperlipidemia with target LDL less than 100 2013    Diagnosis updated by automated process. Provider to review and confirm.     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     S/P repair of ventral hernia 2017     SCC (squamous cell carcinoma), multiple 2014    Hands, arms, legs     Sedative, hypnotic or anxiolytic dependence (H) 2012     Skin cancer     squamous cell hands, arms and legs     Stroke (H)     h/o PFO      Past Surgical History:   Procedure Laterality Date     ABDOMEN SURGERY  3/2014    Incarcerated spigelian hernia recurence 2016     APPENDECTOMY  3/2014     APPENDECTOMY       CARDIAC SURGERY      PFO closure thru femoral artery     COLONOSCOPY  2014    Polyps     COLONOSCOPY N/A 5/10/2022    Procedure: COLONOSCOPY;  Surgeon: Bro Dupont MD, MD;  Location: [facility] Main OR     DENTAL SURGERY      [facility] teeth     ESOPHAGOSCOPY, GASTROSCOPY, DUODENOSCOPY (EGD), COMBINED N/A 5/10/2022    Procedure:  ESOPHAGOGASTRODUODENOSCOPY WITH BIOPSIES;  Surgeon: Bro Dupont MD, MD;  Location: [facility] Main OR     GI SURGERY      upper GI     HERNIA REPAIR  3/2014    Incarcerated spigelian hernia/ recurence 2016     HERNIA REPAIR       PATENT FORAMEN OVALE CLOSURE  2007      Allergies   Allergen Reactions     Clopidogrel Hives, Swelling, Rash and GI Disturbance     Plavix       Nsaids      History of ulcers  Other reaction(s): vomiting      Social History     Tobacco Use     Smoking status: Former     Types: Cigarettes     Start date: 1977     Quit date: 2012     Years since quittin.2     Smokeless tobacco: Never     Tobacco comments:     Was a social smoker   Vaping Use     Vaping status: Not on file   Substance Use Topics     Alcohol use: Not Currently     Comment: rare, 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      Wt Readings from Last 1 Encounters:   04/10/23 55.5 kg (122 lb 6 oz)        Anesthesia Evaluation            ROS/MED HX  ENT/Pulmonary:  - neg pulmonary ROS     Neurologic:  - neg neurologic ROS   (+) CVA, TIA,     Cardiovascular:  - neg cardiovascular ROS     METS/Exercise Tolerance: >4 METS    Hematologic:  - neg hematologic  ROS     Musculoskeletal:  - neg musculoskeletal ROS     GI/Hepatic:  - neg GI/hepatic ROS   (+) GERD, hiatal hernia,     Renal/Genitourinary:  - neg Renal ROS     Endo:  - neg endo ROS     Psychiatric/Substance Use:  - neg psychiatric ROS     Infectious Disease:  - neg infectious disease ROS     Malignancy:  - neg malignancy ROS     Other:  - neg other ROS          Physical Exam    Airway        Mallampati: II    Neck ROM: full     Respiratory Devices and Support         Dental           Cardiovascular   cardiovascular exam normal          Pulmonary   pulmonary exam normal                OUTSIDE LABS:  CBC:   Lab Results   Component Value Date    WBC 13.5 (H) 2022    WBC 18.8 (H) 2019    HGB 15.2 2022    HGB  15.9 07/18/2019    HCT 45.0 04/05/2022    HCT 47.5 07/18/2019     04/05/2022     07/18/2019     BMP:   Lab Results   Component Value Date     03/29/2023     02/10/2023    POTASSIUM 5.0 03/29/2023    POTASSIUM 5.1 02/10/2023    CHLORIDE 104 03/29/2023    CHLORIDE 104 02/10/2023    CO2 24 03/29/2023    CO2 27 02/10/2023    BUN 16.7 03/29/2023    BUN 16.2 02/10/2023    CR 0.84 03/29/2023    CR 0.91 02/10/2023     (H) 03/29/2023     (H) 02/10/2023     COAGS:   Lab Results   Component Value Date    PTT 28 03/21/2005    INR 1.03 03/21/2005     POC: No results found for: BGM, HCG, HCGS  HEPATIC:   Lab Results   Component Value Date    ALBUMIN 4.6 02/10/2023    PROTTOTAL 6.9 02/10/2023    ALT 28 02/10/2023    AST 24 02/10/2023    ALKPHOS 96 02/10/2023    BILITOTAL 0.6 02/10/2023     OTHER:   Lab Results   Component Value Date    LACT 0.9 04/05/2022    A1C 5.6 04/12/2013    LINA 9.8 03/29/2023    LIPASE 28 04/05/2022    TSH 1.14 02/10/2023    CRP <5.0 07/16/2013    SED 4 04/12/2013       Anesthesia Plan    ASA Status:  2      Anesthesia Type: General.     - Airway: ETT   Induction: Intravenous.           Consents    Anesthesia Plan(s) and associated risks, benefits, and realistic alternatives discussed. Questions answered and patient/representative(s) expressed understanding.    - Discussed:     - Discussed with:  Patient         Postoperative Care       PONV prophylaxis: Ondansetron (or other 5HT-3), Dexamethasone or Solumedrol     Comments:                Rodney Kelsey MD

## 2023-04-10 NOTE — OP NOTE
Name:  Vasquez DONA Ann  PCP:  Enrrique Reeves  Procedure Date:  4/10/2023      Procedure(s):  FUNDOPLICATION, PARAESOPHAGEAL HERNIA REPAIR, ROBOT-ASSISTED, LAPAROSCOPIC, USING DA RAMÓN XI    Pre-Procedure Diagnosis:  Hiatal hernia [K44.9]     Post-Procedure Diagnosis:    Paraesophageal hernia    Surgeon(s):  Vasquez Blackwood DO    Circulator: Shiloh Hall, CHANDRIKA; Rajinder Esteban RN; Laure Ivan RN  Scrub Person: Julienne Benson; Gayla Francois    Anesthesia Type:  GET      Findings:  Moderately sized paraesophageal hernia    Operative Report:    The Patient was taken back to the operating room and placed in a supine position.  Endotracheal intubation was performed and a Ambrosio catheter was placed.  The abdomen was prepped and draped in a sterile fashion.  I  made an 8 mm incision.  I then establish pneumoperitoneum using a Veress needle technique.  Once this was complete I placed an 8 mm trocar with a 5 mm 30 degree camera into the abdomen.  I scrutinized the surrounding structures for any injuries upon entry none were found.  Next, I placed a Munir liver retractor via a 5 mm subxiphoid incision.  This was secured to the bed in the standard fashion.  I then placed 3 additional 8 mm trochars in the abdomen.  One in the right upper quadrant and 2 in the left upper quadrant.  I then upsized the left mid abdominal a millimeter trocar to a 12 mm trocar. Bio A mesh was placed into the abdomen via the 12 mm trocar.  The robot was docked in the standard fashion once the patient was placed in reverse Trendelenburg.    I then proceeded to address the hiatus.  I took down the pars lucidum and spared the vessels and gastrohepatic ligament.  There was noted to be a moderately sized paraesophageal hernia with a third of the stomach retracted up into the chest.  I then entered the avascular plane between the hernia sac and the crura and circumferentially dissected out the hernia sac using vessel sealing device.   Once I came to the left saurabh I then turned my attention to the greater curvature of the stomach.  I took down the short gastrics with the vessel sealing device all the way up to the angle of His.  I then created a retroesophageal window and placed 1/4 inch Penrose through this for esophageal retraction.  The posterior sac was also dissected free as was the small lipoma in the retroesophageal space.  I ensured a wide and deep mediastinal dissection of the periesophageal tissue taking care as to preserve the vagus nerves.  Once this was complete I then reapproximated the left and right crura with a 0 nonabsorbable V lock suture in a running fashion.  I then placed the previously fashioned Bio A mesh into the retroesophageal window and sutured this in place for extra added support with additional 2-0 Vicryl sutures.  The mesh was wrapped around the esophagus without stricturing or narrowing.  Once this was in place I then created a posterior toupee fundoplication.  This was done with adequate abdominal esophageal length and no evidence of gastric or esophageal retraction back into the chest cavity.  The fundoplication was completed with 0 Ethibond sutures in interrupted fashion.  Once this was complete all nonabsorbable material was removed.  The robot was undocked and Exparel local anesthetic was injected into all port sites.  The liver tractor was removed and the left mid abdominal 8 mm trocar and 12 mm fascial defect was reapproximated with 0 Vicryl suture using an Endo fascial closure technique.  I then removed all trochars and reapproximated skin edges with 4-0 Monocryl suture.  The abdomen was cleaned and all wounds were cleaned and covered with Steri-Strips and Band-Aids.  The Ambrosio catheter was removed the patient was extubated sent to the PACU to undergo recovery.  All lap counts and needle counts were correct at the end of the procedure.    Estimated Blood Loss:   10cc    Specimens:    * No specimens in log  *       Drains:        Complications:    None    Vasquez Blackwood DO

## 2023-04-10 NOTE — ANESTHESIA POSTPROCEDURE EVALUATION
Patient: Vasquez Ann    Procedure: Procedure(s):  FUNDOPLICATION, PARAESOPHAGEAL HERNIA REPAIR, ROBOT-ASSISTED, LAPAROSCOPIC, USING DA RAMÓN XI       Anesthesia Type:  General    Note:  Disposition: Inpatient   Postop Pain Control: Uneventful            Sign Out: Well controlled pain   PONV: No   Neuro/Psych: Uneventful            Sign Out: Acceptable/Baseline neuro status   Airway/Respiratory: Uneventful            Sign Out: Acceptable/Baseline resp. status   CV/Hemodynamics: Uneventful            Sign Out: Acceptable CV status; No obvious hypovolemia; No obvious fluid overload   Other NRE:    DID A NON-ROUTINE EVENT OCCUR?            Last vitals:  Vitals Value Taken Time   /74 04/10/23 1550   Temp 37.6  C (99.6  F) 04/10/23 1550   Pulse 96 04/10/23 1555   Resp 19 04/10/23 1555   SpO2 100 % 04/10/23 1555   Vitals shown include unvalidated device data.    Electronically Signed By: Rodney Kelsey MD  April 10, 2023  3:57 PM

## 2023-04-10 NOTE — ANESTHESIA PROCEDURE NOTES
Airway       Patient location during procedure: OR       Procedure Start/Stop Times: 4/10/2023 1:42 PM  Staff -        CRNA: Morgan Dillard APRN CRNA       Other Anesthesia Staff: Chelita Forman RN       Performed By: SRNA  Consent for Airway        Urgency: elective  Indications and Patient Condition       Indications for airway management: inez-procedural       Induction type:intravenous       Mask difficulty assessment: 1 - vent by mask    Final Airway Details       Final airway type: endotracheal airway       Successful airway: ETT - single  Endotracheal Airway Details        ETT size (mm): 7.5       Cuffed: yes       Successful intubation technique: direct laryngoscopy       DL Blade Type: Mazariegos 2       Grade View of Cords: 1       Adjucts: stylet       Position: Right       Measured from: gums/teeth       Secured at (cm): 23       Bite block used: None    Post intubation assessment        Placement verified by: capnometry, equal breath sounds and chest rise        Number of attempts at approach: 1       Number of other approaches attempted: 0       Secured with: silk tape       Ease of procedure: easy       Dentition: Intact and Unchanged       Dental guard used and removed. Dental Guard Type: Proguard Red.    Medication(s) Administered   Medication Administration Time: 4/10/2023 1:42 PM

## 2023-04-10 NOTE — PHARMACY-ADMISSION MEDICATION HISTORY
Pharmacist Admission Medication History    Admission medication history is complete. The information provided in this note is only as accurate as the sources available at the time of the update.    Medication reconciliation/reorder completed by provider prior to medication history? No    Information Source(s): Patient and CareEverywhere/SureScripts via in-person    Pertinent Information: n/a    Changes made to PTA medication list:    Added: Tylenol    Deleted: None    Changed: Duloxetine 40mg to 20mg daily    Medication Affordability:  Not including over the counter (OTC) medications, was there a time in the past 12 months when you did not take your medications as prescribed because of cost?: No    Allergies reviewed with patient and updates made in EHR: yes    Medication History Completed By: Abigail Mejia, Cam 4/10/2023 12:40 PM    PTA Med List   Medication Sig Last Dose     acetaminophen (TYLENOL) 500 MG tablet Take 1,000 mg by mouth daily as needed for mild pain Past Week at PRN     aspirin 81 MG tablet Take 1 tablet by mouth daily. 3/31/2023 at AM     atorvastatin (LIPITOR) 20 MG tablet Take 1 tablet (20 mg) by mouth daily 4/5/2023 at AM     clonazePAM (KLONOPIN) 0.5 MG tablet Take 0.5 mg by mouth daily as needed for anxiety or sleep 4/7/2023 at HS     co-enzyme Q-10 100 MG CAPS capsule Take 100 mg by mouth daily 4/5/2023 at AM     DULoxetine (CYMBALTA) 20 MG capsule Take 20 mg by mouth daily 4/10/2023 at AM     famotidine (PEPCID) 20 MG tablet Take 20 mg by mouth At Bedtime 4/9/2023 at HS     pantoprazole (PROTONIX) 40 MG EC tablet take 1 tablet by mouth twice daily (before breakfast and before dinner) 4/10/2023 at AM     polyethylene glycol (MIRALAX) 17 g packet Take 1 packet by mouth daily Uses 3 tsps of packet daily Past Week at 3 days ago

## 2023-04-10 NOTE — ANESTHESIA CARE TRANSFER NOTE
Patient: Vasquez Ann    Procedure: Procedure(s):  FUNDOPLICATION, PARAESOPHAGEAL HERNIA REPAIR, ROBOT-ASSISTED, LAPAROSCOPIC, USING DA RAMÓN XI       Diagnosis: Hiatal hernia [K44.9]  Diagnosis Additional Information: No value filed.    Anesthesia Type:   General     Note:    Oropharynx: oropharynx clear of all foreign objects and spontaneously breathing  Level of Consciousness: awake and drowsy  Oxygen Supplementation: face mask  Level of Supplemental Oxygen (L/min / FiO2): 6  Independent Airway: airway patency satisfactory and stable  Dentition: dentition unchanged  Vital Signs Stable: post-procedure vital signs reviewed and stable  Report to RN Given: handoff report given  Patient transferred to: PACU    Handoff Report: Identifed the Patient, Identified the Reponsible Provider, Reviewed the pertinent medical history, Discussed the surgical course, Reviewed Intra-OP anesthesia mangement and issues during anesthesia, Set expectations for post-procedure period and Allowed opportunity for questions and acknowledgement of understanding      Vitals:  Vitals Value Taken Time   /74 04/10/23 1550   Temp 99.6 F 04/10/23  1550   Pulse 100 04/10/23 1553   Resp 19 04/10/23 1553   SpO2 100 % 04/10/23 1553   Vitals shown include unvalidated device data.    Electronically Signed By: MELONIE Lobo CRNA  April 10, 2023  3:54 PM

## 2023-04-11 VITALS
DIASTOLIC BLOOD PRESSURE: 64 MMHG | HEART RATE: 84 BPM | RESPIRATION RATE: 18 BRPM | OXYGEN SATURATION: 96 % | SYSTOLIC BLOOD PRESSURE: 116 MMHG | HEIGHT: 64 IN | TEMPERATURE: 98.7 F | BODY MASS INDEX: 21.42 KG/M2 | WEIGHT: 125.44 LBS

## 2023-04-11 LAB — GLUCOSE BLDC GLUCOMTR-MCNC: 115 MG/DL (ref 70–99)

## 2023-04-11 PROCEDURE — 250N000013 HC RX MED GY IP 250 OP 250 PS 637: Performed by: SURGERY

## 2023-04-11 PROCEDURE — 82962 GLUCOSE BLOOD TEST: CPT

## 2023-04-11 PROCEDURE — 99024 POSTOP FOLLOW-UP VISIT: CPT | Performed by: SURGERY

## 2023-04-11 RX ORDER — AMOXICILLIN 250 MG
1 CAPSULE ORAL 2 TIMES DAILY
Qty: 30 TABLET | Refills: 0 | Status: SHIPPED | OUTPATIENT
Start: 2023-04-11 | End: 2023-05-01

## 2023-04-11 RX ORDER — TRAMADOL HYDROCHLORIDE 50 MG/1
50 TABLET ORAL EVERY 6 HOURS PRN
Qty: 10 TABLET | Refills: 0 | Status: SHIPPED | OUTPATIENT
Start: 2023-04-11 | End: 2023-05-01

## 2023-04-11 RX ORDER — GABAPENTIN 100 MG/1
100 CAPSULE ORAL AT BEDTIME
Qty: 30 CAPSULE | Refills: 0 | Status: SHIPPED | OUTPATIENT
Start: 2023-04-11 | End: 2023-05-01

## 2023-04-11 RX ADMIN — ACETAMINOPHEN 975 MG: 325 TABLET ORAL at 01:52

## 2023-04-11 RX ADMIN — TRAMADOL HYDROCHLORIDE 50 MG: 50 TABLET, FILM COATED ORAL at 01:59

## 2023-04-11 RX ADMIN — ATORVASTATIN CALCIUM 20 MG: 10 TABLET, FILM COATED ORAL at 08:57

## 2023-04-11 RX ADMIN — ACETAMINOPHEN 650 MG: 325 TABLET ORAL at 09:09

## 2023-04-11 ASSESSMENT — ACTIVITIES OF DAILY LIVING (ADL)
ADLS_ACUITY_SCORE: 18

## 2023-04-11 NOTE — PLAN OF CARE
PRIMARY DIAGNOSIS: ACUTE PAIN  OUTPATIENT/OBSERVATION GOALS TO BE MET BEFORE DISCHARGE:  1. Pain Status: Improved-controlled with oral pain medications.    2. Return to near baseline physical activity: Yes    3. Cleared for discharge by consultants (if involved): No    Discharge Planner Nurse   Safe discharge environment identified: Yes  Barriers to discharge: No       Entered by: Leann Hester RN 04/10/2023 8:15 PM     Please review provider order for any additional goals.   Nurse to notify provider when observation goals have been met and patient is ready for discharge.Goal Outcome Evaluation:       Pt rating incisional pain #3, scheduled Tylenol given with good relief.    Leann Hester RN

## 2023-04-11 NOTE — PROGRESS NOTES
Paged cross paged again regarding patients diarrhea. Has had 8 episodes since 1500 and is asking something to be done. Awaiting response

## 2023-04-11 NOTE — PLAN OF CARE
PRIMARY DIAGNOSIS: ACUTE PAIN  OUTPATIENT/OBSERVATION GOALS TO BE MET BEFORE DISCHARGE:  1. Pain Status: Improved-controlled with oral pain medications.    2. Return to near baseline physical activity: Yes    3. Cleared for discharge by consultants (if involved): N/A    Discharge Planner Nurse   Safe discharge environment identified: Yes  Barriers to discharge: No       Entered by: Leann Hester RN 04/10/2023 10:01 PM   Pt ambulating with stand by assist.  Please review provider order for any additional goals.   Nurse to notify provider when observation goals have been met and patient is ready for discharge.Goal Outcome Evaluation:       Leann Hester RN

## 2023-04-11 NOTE — PROGRESS NOTES
Vasquez Ann is doing well, tolerating liquids, pain tolerable, ambulating        Vitals:    04/10/23 1930 04/10/23 2346 04/11/23 0340 04/11/23 0724   BP: 117/70 125/62 118/67 116/64   BP Location: Left arm Left arm Left arm Left arm   Patient Position:       Cuff Size:       Pulse: 112 100 96 84   Resp: 20 18 18 18   Temp: 98.4  F (36.9  C) 98.5  F (36.9  C) 98.1  F (36.7  C) 98.7  F (37.1  C)   TempSrc: Oral Oral Oral Oral   SpO2: 94% 92% 95% 96%   Weight:       Height:           PHYSICAL EXAM:  GEN: No acute distress, comfortable  ABD:soft, dressings intact  EXT:No cyanosis, edema or obvious abnormalities        No results for input(s): WBC, HGB, HCT, PLT in the last 168 hours.    No results for input(s): NA, CO2, BUN, CREATININE, ALBUMIN, BILITOT, ALKPHOS, ALT, AST, GLUCOSE in the last 168 hours.    Invalid input(s): K, CL, CALCIUM, LABALBU, PROT      A/P:  Vasquez Ann is s/p robotic PEH repair  -doing well, home today    Vasquez Blackwood DO  [facility]  [phone]  [facility] Department of Surgery

## 2023-04-11 NOTE — DISCHARGE INSTRUCTIONS
Please call your surgeon, Dr. Blackwood, at [phone] to schedule a follow-up appointment regarding follow-up discussions and or/visits relating to surgery.  You may also call if you have any questions or concerns regarding her hospital stay and any other surgical issue you may have.     ACTIVITIES:  No heavy lifting over 20 pounds or strenuous activities for a total of 6 weeks      WOUND CARE:  May shower 24 hours after surgery, no baths or hot tubs for a total of 2 weeks.  May remove the top superficial dressings if not already done.  If you have white stickers over the incisions these will stay on for a total of 3 to 4 weeks

## 2023-04-11 NOTE — PLAN OF CARE
Goal Outcome Evaluation:       Discharge plan to home with wife. Pt tolerates full liquid diet. Pain is at 3/10 in abdomen with Acetaminophen 650mg given per request. Pt is independent in room, gait is steady. Pt is passing gas. Pt understands to take medication at home and wife sets up medications.

## 2023-05-01 ENCOUNTER — OFFICE VISIT (OUTPATIENT)
Dept: SURGERY | Facility: CLINIC | Age: 73
End: 2023-05-01
Payer: MEDICARE

## 2023-05-01 VITALS
DIASTOLIC BLOOD PRESSURE: 82 MMHG | WEIGHT: 120.7 LBS | BODY MASS INDEX: 20.61 KG/M2 | SYSTOLIC BLOOD PRESSURE: 124 MMHG | HEIGHT: 64 IN

## 2023-05-01 DIAGNOSIS — Z48.89 POSTOPERATIVE VISIT: Primary | ICD-10-CM

## 2023-05-01 PROCEDURE — 99024 POSTOP FOLLOW-UP VISIT: CPT | Performed by: SURGERY

## 2023-05-01 NOTE — LETTER
"    5/1/2023         RE: Vasquez Ann  [address]  [address]  [place] [place] [address]        Dear Colleague,    Thank you for referring your patient, Vasquez Ann, to the [facility] [place]. Please see a copy of my visit note below.     HPI: Vasquez Ann is here for follow up after his robotic paraesophageal hernia repair.  He is doing well with minimal discomfort.  He is tolerating a relatively regular diet.    Allergies, Medications, Social History, Past Medical History and Past Surgical History were reviewed and are noted in the chart.    /82 (BP Location: Right arm, Patient Position: Sitting)   Ht 1.626 m (5' 4\")   Wt 54.7 kg (120 lb 11.2 oz)   BMI 20.72 kg/m    Body mass index is 20.72 kg/m .      EXAM:   GENERAL: Appears well  Abdomen-soft, well-healed port sites    Incision/Surgical Site 04/10/23 Bilateral Abdomen (Active)       Assessment/Plan: Vasquez Ann continues to do well. His wound is healing and overall progressing well.  At this point he will maintain light activities for total of 6 weeks from surgery.  He may advance his diet as tolerated.  He is doing extremely well and will follow-up with me at any time if he has any further questions or concerns.    Vasquez Blackwood DO [facility] Department of Surgery      Again, thank you for allowing me to participate in the care of your patient.        Sincerely,        Vasquez Blackwood,     "

## 2023-05-03 NOTE — PROGRESS NOTES
" HPI: Vasquez Ann is here for follow up after his robotic paraesophageal hernia repair.  He is doing well with minimal discomfort.  He is tolerating a relatively regular diet.    Allergies, Medications, Social History, Past Medical History and Past Surgical History were reviewed and are noted in the chart.    /82 (BP Location: Right arm, Patient Position: Sitting)   Ht 1.626 m (5' 4\")   Wt 54.7 kg (120 lb 11.2 oz)   BMI 20.72 kg/m    Body mass index is 20.72 kg/m .      EXAM:   GENERAL: Appears well  Abdomen-soft, well-healed port sites    Incision/Surgical Site 04/10/23 Bilateral Abdomen (Active)       Assessment/Plan: Vasquez Ann continues to do well. His wound is healing and overall progressing well.  At this point he will maintain light activities for total of 6 weeks from surgery.  He may advance his diet as tolerated.  He is doing extremely well and will follow-up with me at any time if he has any further questions or concerns.    Vasquez Blackwood DO [facility] Department of Surgery  "

## 2023-05-22 ENCOUNTER — OFFICE VISIT (OUTPATIENT)
Dept: SURGERY | Facility: CLINIC | Age: 73
End: 2023-05-22
Payer: MEDICARE

## 2023-05-22 DIAGNOSIS — Z48.89 POSTOPERATIVE VISIT: Primary | ICD-10-CM

## 2023-05-22 PROCEDURE — 99024 POSTOP FOLLOW-UP VISIT: CPT | Performed by: SURGERY

## 2023-05-22 NOTE — LETTER
5/22/2023         RE: Vasquez Ann  [address]  [address]  [place] [address]        Dear Colleague,    Thank you for referring your patient, Vasquez Ann, to the [facility] [place]. Please see a copy of my visit note below.     HPI: Vasquez Ann is here for follow up for evaluation of a previously repaired left-sided spigelian hernia site.  He noticed a small nodule at the previous site of repair and had some mild discomfort after doing some activity.  He denies any obvious bulges, fever chills or any other symptoms.  Otherwise he is doing well and has no other complaints.    Allergies, Medications, Social History, Past Medical History and Past Surgical History were reviewed and are noted in the chart.    There were no vitals taken for this visit.  There is no height or weight on file to calculate BMI.      EXAM:   GENERAL: Appears well  Abdomen- site of previous repair of the spigelian hernia demonstrates nodularity likely consistent with contracted mesh versus suture granuloma.  No obvious recurrent hernia defects noted    Incision/Surgical Site 04/10/23 Bilateral Abdomen (Active)       Assessment/Plan: Vasquez Ann continues to do well.  On physical exam I was unable to appreciate any obvious hernias.  However, he may have mesh contraction which is creating a nodule versus a suture granuloma with significant scar tissue.  At this point I have recommended we maintain an observational strategy.  If he notices any bulges or has any recurrent or worsening pain or any other new symptoms pertaining to the left side of his abdomen, he will call into clinic and we can schedule a CT scan for definitive diagnosis.  Otherwise he may continue with his normal activities and call me at any time if he has any further questions or concerns.    Vasquez Blackwood,  [facility] Department of Surgery      Again, thank you for allowing me to  participate in the care of your patient.        Sincerely,        Vasquez Blackwood, DO

## 2023-05-22 NOTE — PROGRESS NOTES
HPI: Vasquez Ann is here for follow up for evaluation of a previously repaired left-sided spigelian hernia site.  He noticed a small nodule at the previous site of repair and had some mild discomfort after doing some activity.  He denies any obvious bulges, fever chills or any other symptoms.  Otherwise he is doing well and has no other complaints.    Allergies, Medications, Social History, Past Medical History and Past Surgical History were reviewed and are noted in the chart.    There were no vitals taken for this visit.  There is no height or weight on file to calculate BMI.      EXAM:   GENERAL: Appears well  Abdomen- site of previous repair of the spigelian hernia demonstrates nodularity likely consistent with contracted mesh versus suture granuloma.  No obvious recurrent hernia defects noted    Incision/Surgical Site 04/10/23 Bilateral Abdomen (Active)       Assessment/Plan: Vasquez Ann continues to do well.  On physical exam I was unable to appreciate any obvious hernias.  However, he may have mesh contraction which is creating a nodule versus a suture granuloma with significant scar tissue.  At this point I have recommended we maintain an observational strategy.  If he notices any bulges or has any recurrent or worsening pain or any other new symptoms pertaining to the left side of his abdomen, he will call into clinic and we can schedule a CT scan for definitive diagnosis.  Otherwise he may continue with his normal activities and call me at any time if he has any further questions or concerns.    Vasquez Blackwood DO [facility] Department of Surgery

## 2023-06-05 ENCOUNTER — MYC MEDICAL ADVICE (OUTPATIENT)
Dept: SURGERY | Facility: CLINIC | Age: 73
End: 2023-06-05
Payer: MEDICARE

## 2023-06-05 DIAGNOSIS — K45.8 HERNIA OF FLANK: Primary | ICD-10-CM

## 2023-06-15 ENCOUNTER — HOSPITAL ENCOUNTER (OUTPATIENT)
Dept: CT IMAGING | Facility: HOSPITAL | Age: 73
Discharge: HOME | End: 2023-06-15
Attending: SURGERY | Admitting: SURGERY
Payer: MEDICARE

## 2023-06-15 DIAGNOSIS — K45.8 HERNIA OF FLANK: ICD-10-CM

## 2023-06-15 PROCEDURE — G1010 CDSM STANSON: HCPCS

## 2023-06-26 ENCOUNTER — VIRTUAL VISIT (OUTPATIENT)
Dept: SURGERY | Facility: CLINIC | Age: 73
End: 2023-06-26
Payer: MEDICARE

## 2023-06-26 DIAGNOSIS — Z48.89 POSTOPERATIVE VISIT: Primary | ICD-10-CM

## 2023-06-26 NOTE — LETTER
"    6/26/2023         RE: Vasquez Ann  [address]  [address]  [place] [address]        Dear Colleague,    Thank you for referring your patient, Vasquez Ann, to the [facility] [place]. Please see a copy of my visit note below.      The patient has been notified of following:     \"This telephone visit will be conducted via a call between you and your physician/provider. We have found that certain health care needs can be provided without the need for a physical exam.  This service lets us provide the care you need with a short phone conversation.  If a prescription is necessary we can send it directly to your pharmacy.  If lab work is needed we can place an order for that and you can then stop by our lab to have the test done at a later time.    Telephone visits are billed at different rates depending on your insurance coverage. During this emergency period, for some insurers they may be billed the same as an in-person visit.  Please reach out to your insurance provider with any questions.    If during the course of the call the physician/provider feels a telephone visit is not appropriate, you will not be charged for this service.\"    Patient has given verbal consent to a Telephone visit? Yes    What phone number would you like to be contacted at? [phone]    Patient would like to receive their AVS by [employer] [facility] [facility]            Distant Location (provider location):  Off-site    Additional provider notes: I spoke with Duran regarding his CT findings.  At this point we will maintain an observational strategy.  He will follow-up with me if he notices any changes in his left side of his of his abdominal wall.    Phone call duration: 10 minutes    Duran Blackwood DO [facility]  [facility] Surgery  [phone]            Again, thank you for allowing me to participate in the care of your patient.        Sincerely,        Vasquez Blackwood, DO    "

## 2023-06-26 NOTE — PROGRESS NOTES
"  The patient has been notified of following:     \"This telephone visit will be conducted via a call between you and your physician/provider. We have found that certain health care needs can be provided without the need for a physical exam.  This service lets us provide the care you need with a short phone conversation.  If a prescription is necessary we can send it directly to your pharmacy.  If lab work is needed we can place an order for that and you can then stop by our lab to have the test done at a later time.    Telephone visits are billed at different rates depending on your insurance coverage. During this emergency period, for some insurers they may be billed the same as an in-person visit.  Please reach out to your insurance provider with any questions.    If during the course of the call the physician/provider feels a telephone visit is not appropriate, you will not be charged for this service.\"    Patient has given verbal consent to a Telephone visit? Yes    What phone number would you like to be contacted at? [phone]    Patient would like to receive their AVS by [facility]            Distant Location (provider location):  Off-site    Additional provider notes: I spoke with Duran regarding his CT findings.  At this point we will maintain an observational strategy.  He will follow-up with me if he notices any changes in his left side of his of his abdominal wall.    Phone call duration: 10 minutes    Duran Blackwood DO [facility] Surgery  [phone]        "

## 2023-06-28 ENCOUNTER — VIRTUAL VISIT (OUTPATIENT)
Dept: SURGERY | Facility: CLINIC | Age: 73
End: 2023-06-28
Payer: MEDICARE

## 2023-06-28 DIAGNOSIS — Z48.89 POSTOPERATIVE VISIT: Primary | ICD-10-CM

## 2023-06-28 NOTE — LETTER
"    6/28/2023         RE: Vasquez Ann  [address]  [address]  [place] [address]        Dear Colleague,    Thank you for referring your patient, Vasquez Ann, to the [facility] [place]. Please see a copy of my visit note below.      The patient has been notified of following:     \"This telephone visit will be conducted via a call between you and your physician/provider. We have found that certain health care needs can be provided without the need for a physical exam.  This service lets us provide the care you need with a short phone conversation.  If a prescription is necessary we can send it directly to your pharmacy.  If lab work is needed we can place an order for that and you can then stop by our lab to have the test done at a later time.    Telephone visits are billed at different rates depending on your insurance coverage. During this emergency period, for some insurers they may be billed the same as an in-person visit.  Please reach out to your insurance provider with any questions.    If during the course of the call the physician/provider feels a telephone visit is not appropriate, you will not be charged for this service.\"    Patient has given verbal consent to a Telephone visit? Yes    What phone number would you like to be contacted at? [phone]     Patient would like to receive their AVS by [employer]            Distant Location (provider location):  On-site    Additional provider notes: I spoke with Mr. Ann regarding the CT findings.  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  Currently and over the last week he has been doing fairly well and has no complaints of discomfort.  At this point we will plan on a nonoperative management strategy.  If he has any recurrent pain or bulges he will follow-up with me " immediately clinic for ongoing discussion.    Phone call duration: 10 minutes    Duran Blackwood DO [facility] Surgery  [phone]            Again, thank you for allowing me to participate in the care of your patient.        Sincerely,        Vasquez Blackwood, DO

## 2023-06-28 NOTE — PROGRESS NOTES
"  The patient has been notified of following:     \"This telephone visit will be conducted via a call between you and your physician/provider. We have found that certain health care needs can be provided without the need for a physical exam.  This service lets us provide the care you need with a short phone conversation.  If a prescription is necessary we can send it directly to your pharmacy.  If lab work is needed we can place an order for that and you can then stop by our lab to have the test done at a later time.    Telephone visits are billed at different rates depending on your insurance coverage. During this emergency period, for some insurers they may be billed the same as an in-person visit.  Please reach out to your insurance provider with any questions.    If during the course of the call the physician/provider feels a telephone visit is not appropriate, you will not be charged for this service.\"    Patient has given verbal consent to a Telephone visit? Yes    What phone number would you like to be contacted at? [phone]     Patient would like to receive their AVS by [facility]            Distant Location (provider location):  On-site    Additional provider notes: I spoke with Mr. Ann regarding the CT findings.  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  Currently and over the last week he has been doing fairly well and has no complaints of discomfort.  At this point we will plan on a nonoperative management strategy.  If he has any recurrent pain or bulges he will follow-up with me immediately clinic for ongoing discussion.    Phone call duration: 10 minutes    Duran Blackwood DO [facility] Surgery  [phone]        "

## 2023-07-31 ENCOUNTER — MYC MEDICAL ADVICE (OUTPATIENT)
Dept: SURGERY | Facility: CLINIC | Age: 73
End: 2023-07-31

## 2023-07-31 ENCOUNTER — HOSPITAL ENCOUNTER (EMERGENCY)
Facility: HOSPITAL | Age: 73
Discharge: HOME | End: 2023-07-31
Attending: EMERGENCY MEDICINE | Admitting: EMERGENCY MEDICINE
Payer: MEDICARE

## 2023-07-31 ENCOUNTER — APPOINTMENT (OUTPATIENT)
Dept: CT IMAGING | Facility: HOSPITAL | Age: 73
End: 2023-07-31
Attending: EMERGENCY MEDICINE
Payer: MEDICARE

## 2023-07-31 ENCOUNTER — TELEPHONE (OUTPATIENT)
Dept: SURGERY | Facility: CLINIC | Age: 73
End: 2023-07-31

## 2023-07-31 VITALS
HEART RATE: 76 BPM | RESPIRATION RATE: 28 BRPM | HEIGHT: 64 IN | DIASTOLIC BLOOD PRESSURE: 75 MMHG | SYSTOLIC BLOOD PRESSURE: 122 MMHG | TEMPERATURE: 97.3 F | WEIGHT: 120 LBS | OXYGEN SATURATION: 99 % | BODY MASS INDEX: 20.49 KG/M2

## 2023-07-31 DIAGNOSIS — K57.92 DIVERTICULITIS: ICD-10-CM

## 2023-07-31 LAB
ALBUMIN SERPL BCG-MCNC: 4.5 G/DL (ref 3.5–5.2)
ALP SERPL-CCNC: 93 U/L (ref 40–129)
ALT SERPL W P-5'-P-CCNC: 21 U/L (ref 0–70)
ANION GAP SERPL CALCULATED.3IONS-SCNC: 9 MMOL/L (ref 7–15)
AST SERPL W P-5'-P-CCNC: 30 U/L (ref 0–45)
ATRIAL RATE - MUSE: 69 BPM
BASOPHILS # BLD AUTO: 0.1 10E3/UL (ref 0–0.2)
BASOPHILS NFR BLD AUTO: 1 %
BILIRUB SERPL-MCNC: 0.5 MG/DL
BUN SERPL-MCNC: 13.9 MG/DL (ref 8–23)
CALCIUM SERPL-MCNC: 9.1 MG/DL (ref 8.8–10.2)
CHLORIDE SERPL-SCNC: 103 MMOL/L (ref 98–107)
CREAT SERPL-MCNC: 0.84 MG/DL (ref 0.67–1.17)
DEPRECATED HCO3 PLAS-SCNC: 27 MMOL/L (ref 22–29)
DIASTOLIC BLOOD PRESSURE - MUSE: 66 MMHG
EOSINOPHIL # BLD AUTO: 0.4 10E3/UL (ref 0–0.7)
EOSINOPHIL NFR BLD AUTO: 4 %
ERYTHROCYTE [DISTWIDTH] IN BLOOD BY AUTOMATED COUNT: 12 % (ref 10–15)
GFR SERPL CREATININE-BSD FRML MDRD: >90 ML/MIN/1.73M2
GLUCOSE SERPL-MCNC: 124 MG/DL (ref 70–99)
HCT VFR BLD AUTO: 46.1 % (ref 40–53)
HGB BLD-MCNC: 15.3 G/DL (ref 13.3–17.7)
IMM GRANULOCYTES # BLD: 0 10E3/UL
IMM GRANULOCYTES NFR BLD: 0 %
INTERPRETATION ECG - MUSE: NORMAL
LIPASE SERPL-CCNC: 17 U/L (ref 13–60)
LYMPHOCYTES # BLD AUTO: 1.9 10E3/UL (ref 0.8–5.3)
LYMPHOCYTES NFR BLD AUTO: 20 %
MCH RBC QN AUTO: 31.1 PG (ref 26.5–33)
MCHC RBC AUTO-ENTMCNC: 33.2 G/DL (ref 31.5–36.5)
MCV RBC AUTO: 94 FL (ref 78–100)
MONOCYTES # BLD AUTO: 0.9 10E3/UL (ref 0–1.3)
MONOCYTES NFR BLD AUTO: 10 %
NEUTROPHILS # BLD AUTO: 6.1 10E3/UL (ref 1.6–8.3)
NEUTROPHILS NFR BLD AUTO: 65 %
NRBC # BLD AUTO: 0 10E3/UL
NRBC BLD AUTO-RTO: 0 /100
P AXIS - MUSE: 31 DEGREES
PLATELET # BLD AUTO: 263 10E3/UL (ref 150–450)
POTASSIUM SERPL-SCNC: 4.1 MMOL/L (ref 3.4–5.3)
PR INTERVAL - MUSE: 154 MS
PROT SERPL-MCNC: 7.1 G/DL (ref 6.4–8.3)
QRS DURATION - MUSE: 76 MS
QT - MUSE: 374 MS
QTC - MUSE: 400 MS
R AXIS - MUSE: 50 DEGREES
RBC # BLD AUTO: 4.92 10E6/UL (ref 4.4–5.9)
SODIUM SERPL-SCNC: 139 MMOL/L (ref 136–145)
SYSTOLIC BLOOD PRESSURE - MUSE: 131 MMHG
T AXIS - MUSE: 63 DEGREES
TROPONIN T SERPL HS-MCNC: 6 NG/L
VENTRICULAR RATE- MUSE: 69 BPM
WBC # BLD AUTO: 9.4 10E3/UL (ref 4–11)

## 2023-07-31 PROCEDURE — G1010 CDSM STANSON: HCPCS

## 2023-07-31 PROCEDURE — 83690 ASSAY OF LIPASE: CPT | Performed by: EMERGENCY MEDICINE

## 2023-07-31 PROCEDURE — 250N000011 HC RX IP 250 OP 636: Mod: JZ | Performed by: EMERGENCY MEDICINE

## 2023-07-31 PROCEDURE — 96375 TX/PRO/DX INJ NEW DRUG ADDON: CPT

## 2023-07-31 PROCEDURE — 93005 ELECTROCARDIOGRAM TRACING: CPT | Performed by: EMERGENCY MEDICINE

## 2023-07-31 PROCEDURE — 96376 TX/PRO/DX INJ SAME DRUG ADON: CPT

## 2023-07-31 PROCEDURE — 99285 EMERGENCY DEPT VISIT HI MDM: CPT | Mod: 25

## 2023-07-31 PROCEDURE — 84484 ASSAY OF TROPONIN QUANT: CPT | Performed by: EMERGENCY MEDICINE

## 2023-07-31 PROCEDURE — 85025 COMPLETE CBC W/AUTO DIFF WBC: CPT | Performed by: EMERGENCY MEDICINE

## 2023-07-31 PROCEDURE — 96374 THER/PROPH/DIAG INJ IV PUSH: CPT | Mod: 59

## 2023-07-31 PROCEDURE — 80053 COMPREHEN METABOLIC PANEL: CPT | Performed by: EMERGENCY MEDICINE

## 2023-07-31 PROCEDURE — 74177 CT ABD & PELVIS W/CONTRAST: CPT | Mod: MG

## 2023-07-31 PROCEDURE — 36415 COLL VENOUS BLD VENIPUNCTURE: CPT | Performed by: EMERGENCY MEDICINE

## 2023-07-31 RX ORDER — ONDANSETRON 4 MG/1
4 TABLET, ORALLY DISINTEGRATING ORAL EVERY 8 HOURS PRN
Qty: 10 TABLET | Refills: 0 | Status: SHIPPED | OUTPATIENT
Start: 2023-07-31 | End: 2023-08-03

## 2023-07-31 RX ORDER — IOPAMIDOL 755 MG/ML
90 INJECTION, SOLUTION INTRAVASCULAR ONCE
Status: COMPLETED | OUTPATIENT
Start: 2023-07-31 | End: 2023-07-31

## 2023-07-31 RX ORDER — ONDANSETRON 2 MG/ML
4 INJECTION INTRAMUSCULAR; INTRAVENOUS EVERY 30 MIN PRN
Status: DISCONTINUED | OUTPATIENT
Start: 2023-07-31 | End: 2023-07-31 | Stop reason: HOSPADM

## 2023-07-31 RX ADMIN — FAMOTIDINE 20 MG: 10 INJECTION, SOLUTION INTRAVENOUS at 07:58

## 2023-07-31 RX ADMIN — ONDANSETRON 4 MG: 2 INJECTION INTRAMUSCULAR; INTRAVENOUS at 09:06

## 2023-07-31 RX ADMIN — ONDANSETRON 4 MG: 2 INJECTION INTRAMUSCULAR; INTRAVENOUS at 07:58

## 2023-07-31 RX ADMIN — IOPAMIDOL 90 ML: 755 INJECTION, SOLUTION INTRAVENOUS at 09:20

## 2023-07-31 ASSESSMENT — ACTIVITIES OF DAILY LIVING (ADL): ADLS_ACUITY_SCORE: 35

## 2023-07-31 NOTE — TELEPHONE ENCOUNTER
Patient states he sent an email to [facility] but wanted to make sure that Dr Blackwood is aware that he was in ER today for stomach pains. States they really couldn't find anything wrong, but want FERNANDA to know what's going on in case he has any ideas as to what can be causing this pain he has now.    Thank you

## 2023-07-31 NOTE — ED PROVIDER NOTES
EMERGENCY DEPARTMENT ENCOUNTER      NAME: Vasquez Ann  AGE: 73 year old male  YOB: 1950  MRN: [id number]  EVALUATION DATE & TIME: 7/31/2023  7:36 AM    PCP: Enrrique Reeves    ED PROVIDER: Zeyad Mo M.D.      Chief Complaint   Patient presents with    Nausea         FINAL IMPRESSION:  1. Diverticulitis          ED COURSE & MEDICAL DECISION MAKING:    Pertinent Labs & Imaging studies reviewed. (See chart for details)  73 year old male presents to the Emergency Department for evaluation of nausea. Patient appears non toxic with stable vitals signs, patient afebrile with no tachycardia or hypoxia. Overall exam is benign.  Lungs are clear and I cannot reproduce any significant tenderness to deep palpation my abdominal exam.  Denies any chest or pulmonary complaints, fevers, change in bowel or bladder habits, blood in his urine or stools.  Review the medical record shows [facility] operative note from 4/10/2023 by Dr. Duran Mñuoz at for fundoplication and paraesophageal hernia repair.  Certainly considered postoperative complications, less likely obstruction, perforation, no flank pain or urinary symptoms to suggest pyelonephritis or nephrolithiasis, no other focal tenderness to suggest biliary obstruction or infection, appendicitis, diverticulitis.  No chest or pulmonary complaints with nothing to suggest atypical ACS, PE or dissection.  We will obtain screening labs, ECG and CT imaging the abdomen pelvis.  Patient was given antiemetics.    Reassessment: Labs by my independent interpretation showed no acute concerning findings, no elevated white blood cell count or fever, hemoglobin 15.3, troponin is negative and ECG nonischemic with certainly nothing suggest cardiac ischemia.  No signs of acute kidney injury with creatinine of 0.89.  CT imaging by my independent interpretation showed no significant free fluid, by report demonstrated acute diverticulitis of the sigmoid colon  without perforation or abscess, also noted adrenal nodule unchanged and stable pulmonary nodule.  Repeat exam the patient is benign he continues to appear quite well and comfortable.  Discussed findings of diverticulitis and patient has longstanding history of diverticulitis and in the past he tells me he is only required bowel rest, gentle diet, has never required antibiotics.  Discussed at length with patient options for antibiotic therapy and at this time he deferred I feel is very reasonable he seems very informed about his diverticulitis in the past.  Discussed all these findings including imaging findings with the patient, at this time will discharge given otherwise benign exam, negative work-up and reassuring vitals with a prescription of Zofran, recommendations for bowel rest and gentle diet and close follow-up with primary care in the next 5 to 7 days.  We will also recommend the patient reach out to his primary surgeon to discuss his ER visit here today.  All of his questions were answered and reasons to return discussed.  Patient felt comfortable this plan was discharged stable condition.    7:38 AM I met the patient and performed my initial interview and exam.   10:08 AM repeat exam benign. Discussed findings and discharge.     Medical Decision Making    History:  Supplemental history from: Documented in chart, if applicable  External Record(s) reviewed: Documented in chart, if applicable.    Work Up:  Chart documentation includes differential considered and any EKGs or imaging independently interpreted by provider, where specified.  In additional to work up documented, I considered the following work up: Documented in chart, if applicable.    External consultation:  Discussion of management with another provider: Documented in chart, if applicable    Complicating factors:  Care impacted by chronic illness: Hyperlipidemia  Care affected by social determinants of health: N/A    Disposition considerations:  Discharge. I discussed a prescription for medicine, but deferred after shared decision making discussion.. N/A.      At the conclusion of the encounter I discussed the results of all of the tests and the disposition. The questions were answered and return precautions provided. The patient or family acknowledged understanding and was agreeable with the care plan.         MEDICATIONS GIVEN IN THE EMERGENCY:  Medications   ondansetron (ZOFRAN) injection 4 mg (4 mg Intravenous $Given 7/31/23 0906)   famotidine (PEPCID) injection 20 mg (20 mg Intravenous $Given 7/31/23 7558)   iopamidol (ISOVUE-370) solution 90 mL (90 mLs Intravenous $Given 7/31/23 0940)       NEW PRESCRIPTIONS STARTED AT TODAY'S ER VISIT  Discharge Medication List as of 7/31/2023 10:18 AM        START taking these medications    Details   ondansetron (ZOFRAN ODT) 4 MG ODT tab Take 1 tablet (4 mg) by mouth every 8 hours as needed for nausea, Disp-10 tablet, R-0, Local Print                  =================================================================    HPI    Patient information was obtained from: patient     Use of Intrepreter: N/A          Vasquez Ann is a 73 year old male who presents for evaluation of nausea.     Per Chart review: 4/10/23: patient had Fundoplication surgery at [facility] to repair a paraesophageal hernia. Patient had multiple follow up visits after surgery. Most recent virtual visit on 6/28/23. Discussed CT taken on 6/15 after surgery. 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 The plan continues for nonoperatively management.      For the last two weeks, the patient reports intermitted episodes of severe nausea. No vomiting. The patient reports these sudden episodes of nausea will only last 5-10 minutes before resolving. Today, the patient developed severe nausea again which resolved after 15  minutes however returned shortly after. Currently, the patient continues mild residual nausea. The patient has a history of back pain but reports recently it has been worse than normal. He also has a history of kidney stones. The patient otherwise denies any abdominal pain, fever, urinary symptoms, or any other complaints at this time.     REVIEW OF SYSTEMS   Constitutional:  Denies fever, chills  Respiratory:  Denies productive cough or increased work of breathing  Cardiovascular:  Denies chest pain, palpitations  GI:  Denies abdominal pain, vomiting, or change in bowel or bladder habits. Positive for nausea   Musculoskeletal:  Denies any joint swelling. Positive for back pain  Skin:  Denies rash   Neurologic:  Denies focal weakness  All systems negative except as marked.     PAST MEDICAL HISTORY:  Past Medical History:   Diagnosis Date    Abdominal pain 01/03/2017    Anxiety     h/o panic attacks    Cerebral infarction (H) 06/20/2012    Depression     resolved    Depressive disorder 2004    Diverticulosis     frequent diverticulitis    Diverticulosis of colon - frequent diverticulitis 12/12/2013    GERD (gastroesophageal reflux disease) 11/09/2013    H/O TIA (transient ischemic attack) and stroke 01/03/2017    Hiatal hernia 06/20/2012    History of stroke with residual effects 2004    TIA / Testing Confirmed Frontal Lobe Impairment    Hyperlipidemia with target LDL less than 100 12/12/2013    Diagnosis updated by automated process. Provider to review and confirm.    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    Skin cancer     squamous cell hands, arms  and legs    Stroke (H) 2004    h/o PFO       PAST SURGICAL HISTORY:  Past Surgical History:   Procedure Laterality Date    ABDOMEN SURGERY  3/2014    Incarcerated spigelian hernia recurence 12/30/2016    APPENDECTOMY  3/2014    APPENDECTOMY      CARDIAC SURGERY  2007    PFO closure thru femoral artery    COLONOSCOPY  2011/ 2014    Polyps    COLONOSCOPY N/A 5/10/2022    Procedure: COLONOSCOPY;  Surgeon: Bro Dupont MD, MD;  Location: [facility] OR    DENTAL SURGERY  1973    wisodm teeth    ESOPHAGOSCOPY, GASTROSCOPY, DUODENOSCOPY (EGD), COMBINED N/A 5/10/2022    Procedure: ESOPHAGOGASTRODUODENOSCOPY WITH BIOPSIES;  Surgeon: Bro Dupont MD, MD;  Location: [facility] OR    GI SURGERY  2010/2014    upper GI    HERNIA REPAIR  3/2014    Incarcerated spigelian hernia/ recurence 12/30/2016    HERNIA REPAIR      LAPAROSCOPIC NISSEN FUNDOPLICATION N/A 4/10/2023    Procedure: FUNDOPLICATION, PARAESOPHAGEAL HERNIA REPAIR, ROBOT-ASSISTED, LAPAROSCOPIC, USING DA RAMÓN XI;  Surgeon: Vasquez Blackwood DO;  Location: [facility] OR    PATENT FORAMEN OVALE CLOSURE  07/2007         CURRENT MEDICATIONS:    Prior to Admission medications    Medication Sig Start Date End Date Taking? Authorizing Provider   acetaminophen (TYLENOL) 500 MG tablet Take 1,000 mg by mouth daily as needed for mild pain    Unknown, Entered By History   aspirin 81 MG tablet Take 1 tablet by mouth daily.    Reported, Patient   atorvastatin (LIPITOR) 20 MG tablet Take 1 tablet (20 mg) by mouth daily 12/12/13   Medina De La Torre MD   clonazePAM (KLONOPIN) 0.5 MG tablet Take 0.5 mg by mouth daily as needed for anxiety or sleep 4/15/22   Reported, Patient   co-enzyme Q-10 100 MG CAPS capsule Take 100 mg by mouth daily    Reported, Patient   DULoxetine (CYMBALTA) 20 MG capsule Take 20 mg by mouth daily 2/6/23   Reported, Patient   gabapentin (NEURONTIN) 100 MG capsule Take 1 capsule (100 mg) by mouth 2 times daily 5/9/23   Glenn Decker  "MD [place]        ALLERGIES:  Allergies   Allergen Reactions    Clopidogrel Hives, Swelling, Rash and GI Disturbance     Plavix      Nsaids      History of ulcers  Other reaction(s): vomiting       FAMILY HISTORY:  Family History   Problem Relation Age of Onset    Cerebrovascular Disease Mother         stroke    Cancer Father         Lung Caner        SOCIAL HISTORY:   Social History     Socioeconomic History    Marital status:    Tobacco Use    Smoking status: Former     Types: Cigarettes     Start date: 1977     Quit date: 2012     Years since quittin.5    Smokeless tobacco: Never    Tobacco comments:     Was a social smoker   Substance and Sexual Activity    Alcohol use: Not Currently     Comment: rare, 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 No       VITALS:  Patient Vitals for the past 24 hrs:   BP Temp Temp src Pulse Resp SpO2 Height Weight   23 1000 -- -- -- 76 28 99 % -- --   23 0845 122/75 -- -- 72 10 97 % -- --   23 0733 (!) 146/72 97.3  F (36.3  C) Tympanic 86 12 99 % 1.626 m (5' 4\") 54.4 kg (120 lb)        PHYSICAL EXAM    Constitutional:  Awake, alert, in no apparent distress  HENT:  Normocephalic, Atraumatic. Bilateral external ears normal. Oropharynx moist. Nose normal. Neck- Normal range of motion with no guarding, No midline cervical tenderness, Supple, No stridor.   Eyes:  PERRL, EOMI with no signs of entrapment, Conjunctiva normal, No discharge.   Respiratory:  Normal breath sounds, No respiratory distress, No wheezing.    Cardiovascular:  Normal heart rate, Normal rhythm, No appreciable rubs or gallops.   GI:  Soft, No tenderness, No distension, No palpable masses  : No CVA tenderness  Musculoskeletal:  Intact distal pulses, No edema. Good range of motion in all major joints. No " tenderness to palpation or major deformities noted.  Integument:  Warm, Dry, No erythema, No rash.   Neurologic:  Alert & oriented, Normal motor function, Normal sensory function, No focal deficits noted.   Psychiatric:  Affect normal, Judgment normal, Mood normal.     LAB:  All pertinent labs reviewed and interpreted.  Results for orders placed or performed during the hospital encounter of 07/31/23   CT Abdomen Pelvis w Contrast    Impression    IMPRESSION:   1.  Acute diverticulitis of the sigmoid colon. No perforation or abscess.  2.  1 cm left adrenal nodule, indeterminate though unchanged.  3.  Stable 3 mm right lower lobe pulmonary nodule.    REFERENCE:  Guidelines for Management of Incidental Pulmonary Nodules Detected on CT Images: From the Fleischner Society 2017.   Guidelines apply to incidental nodules in patients who are 35 years or older.  Guidelines do not apply to lung cancer screening, patients with immunosuppression, or patients with known primary cancer.    SINGLE NODULE  Nodule size <6 mm  Low-risk patients: No follow-up needed.  High-risk patients: Optional follow-up at 12 months.     Comprehensive metabolic panel   Result Value Ref Range    Sodium 139 136 - 145 mmol/L    Potassium 4.1 3.4 - 5.3 mmol/L    Chloride 103 98 - 107 mmol/L    Carbon Dioxide (CO2) 27 22 - 29 mmol/L    Anion Gap 9 7 - 15 mmol/L    Urea Nitrogen 13.9 8.0 - 23.0 mg/dL    Creatinine 0.84 0.67 - 1.17 mg/dL    Calcium 9.1 8.8 - 10.2 mg/dL    Glucose 124 (H) 70 - 99 mg/dL    Alkaline Phosphatase 93 40 - 129 U/L    AST 30 0 - 45 U/L    ALT 21 0 - 70 U/L    Protein Total 7.1 6.4 - 8.3 g/dL    Albumin 4.5 3.5 - 5.2 g/dL    Bilirubin Total 0.5 <=1.2 mg/dL    GFR Estimate >90 >60 mL/min/1.73m2   Result Value Ref Range    Lipase 17 13 - 60 U/L   CBC with platelets and differential   Result Value Ref Range    WBC Count 9.4 4.0 - 11.0 10e3/uL    RBC Count 4.92 4.40 - 5.90 10e6/uL    Hemoglobin 15.3 13.3 - 17.7 g/dL    Hematocrit 46.1  40.0 - 53.0 %    MCV 94 78 - 100 fL    MCH 31.1 26.5 - 33.0 pg    MCHC 33.2 31.5 - 36.5 g/dL    RDW 12.0 10.0 - 15.0 %    Platelet Count 263 150 - 450 10e3/uL    % Neutrophils 65 %    % Lymphocytes 20 %    % Monocytes 10 %    % Eosinophils 4 %    % Basophils 1 %    % Immature Granulocytes 0 %    NRBCs per 100 WBC 0 <1 /100    Absolute Neutrophils 6.1 1.6 - 8.3 10e3/uL    Absolute Lymphocytes 1.9 0.8 - 5.3 10e3/uL    Absolute Monocytes 0.9 0.0 - 1.3 10e3/uL    Absolute Eosinophils 0.4 0.0 - 0.7 10e3/uL    Absolute Basophils 0.1 0.0 - 0.2 10e3/uL    Absolute Immature Granulocytes 0.0 <=0.4 10e3/uL    Absolute NRBCs 0.0 10e3/uL   Result Value Ref Range    Troponin T, High Sensitivity 6 <=22 ng/L       RADIOLOGY:  CT Abdomen Pelvis w Contrast   Preliminary Result   IMPRESSION:    1.  Acute diverticulitis of the sigmoid colon. No perforation or abscess.   2.  1 cm left adrenal nodule, indeterminate though unchanged.   3.  Stable 3 mm right lower lobe pulmonary nodule.      REFERENCE:   Guidelines for Management of Incidental Pulmonary Nodules Detected on CT Images: From the Fleischner Society 2017.    Guidelines apply to incidental nodules in patients who are 35 years or older.   Guidelines do not apply to lung cancer screening, patients with immunosuppression, or patients with known primary cancer.      SINGLE NODULE   Nodule size <6 mm   Low-risk patients: No follow-up needed.   High-risk patients: Optional follow-up at 12 months.                EKG:    Sinus rhythm, no specific ST acute ischemic changes, no concerning dysrhythmias or for prolongation, compared to ECG of July 15, 2013, no specific ST acute ischemic change appreciated  I have independently reviewed and interpreted the EKG(s) documented above.    PROCEDURES:   none     [employer] clipkit System Documentation:   CMS Diagnoses:        I, Zehra Pickard, am serving as a scribe to document services personally performed by Zeyad Mo MD, based on my  observation and the provider's statements to me. I, Zeyad Mo MD attest that Zehra Himsmickey is acting in a scribe capacity, has observed my performance of the services and has documented them in accordance with my direction.    Zeyad Mo M.D.  Emergency Medicine  [facility] EMERGENCY DEPARTMENT  [address] [place] [id number]  [phone]  Dept: [phone]     Zeyad Mo MD  07/31/23 1033

## 2023-07-31 NOTE — ED TRIAGE NOTES
Pt had a Fundoplication in April 2023 and has been doing well until about 2 weeks go.  About 2 weeks ago, he developed 5-10 mins of intense nausea and then it went away. This am, pt developed intense nausea this am and then he started retching.  This slowly decreased in intensity over an hour but the nausea is still present.

## 2023-07-31 NOTE — TELEPHONE ENCOUNTER
Responded to pt through his My Chart message. Will discuss with Dr. Blackwood and reach out to pt with recommendations .

## 2023-12-05 DIAGNOSIS — M62.89 MYOTONIA: Primary | ICD-10-CM

## 2023-12-11 ENCOUNTER — OFFICE VISIT (OUTPATIENT)
Dept: NEUROLOGY | Facility: CLINIC | Age: 73
End: 2023-12-11
Payer: MEDICARE

## 2023-12-11 VITALS
HEART RATE: 73 BPM | HEIGHT: 64 IN | SYSTOLIC BLOOD PRESSURE: 126 MMHG | OXYGEN SATURATION: 100 % | BODY MASS INDEX: 21.34 KG/M2 | WEIGHT: 125 LBS | DIASTOLIC BLOOD PRESSURE: 81 MMHG

## 2023-12-11 DIAGNOSIS — G71.19 PARAMYOTONIA CONGENITA: Primary | ICD-10-CM

## 2023-12-11 PROCEDURE — 99214 OFFICE O/P EST MOD 30 MIN: CPT | Mod: GC | Performed by: PSYCHIATRY & NEUROLOGY

## 2023-12-11 RX ORDER — MEXILETINE HYDROCHLORIDE 150 MG/1
CAPSULE ORAL
Qty: 90 CAPSULE | Refills: 1 | Status: SHIPPED | OUTPATIENT
Start: 2023-12-11 | End: 2024-03-06

## 2023-12-11 RX ORDER — POLYETHYLENE GLYCOL 3350 17 G/17G
1 POWDER, FOR SOLUTION ORAL DAILY
COMMUNITY

## 2023-12-11 ASSESSMENT — PAIN SCALES - GENERAL: PAINLEVEL: MODERATE PAIN (5)

## 2023-12-11 NOTE — NURSING NOTE
Chief Complaint   Patient presents with    RECHECK       Vitals were taken and medications were reconciled.    Rajiv Schuster, Technician  2:03 PM  December 11, 2023

## 2023-12-11 NOTE — PROGRESS NOTES
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    He was last seen in our clinic on 2023.  At that time he was continuing to have symptomatic myotonia and did not respond to gabapentin historically.  He was also having pain and discomfort that is relatively new, it was in the thoracic and back, and this include paresthesias that would extend toward the midline.  He mostly describes a burning and electric sensation.  He denies any myotonia, cramps, weakness of the torso or abdomen associated with this.  No loss of bowel or bladder function.  In order to further characterize his pain and symptoms MRI of the C-spine and thoracic spine were ordered which did not show any cord signal change of the cervical or thoracic spine, and essentially ruled out myelopathy.  There was incidental finding of foraminal stenosis that was severe at a few sites.    At baseline he is taking gabapentin 100 mg 3 times daily for pain and duloxetine 40 mg daily that was started years ago for mood.  Since last appointment he also underwent procedure for hiatal hernia and Nissen fundoplication which has almost completely resolved all of his GI symptoms including GERD, reflux, constipation, diarrhea, and GI upset.  He basically says this is no longer symptomatic at all.    In terms of his pain and other symptoms, he continues to have achiness and pain of the shoulder girdles, neck, back, and spreads into his anterior chest and pectoral muscles.  It also  "involves the sternum and muscles of his ribs he thinks.  He describes this achiness that also has qualities of \"tightness\" and \"knotting up\".  He thinks that this is different than the chest pain that he had historically that was related to PFO closure as that was more pain of just the left side and was deeper.  He also denies shortness of breath at this time.  He has occasional numbness and paresthesias of his left biceps and fingers, but this has been chronic and unchanged over years time.  He denies any sensation or weakness of the legs.  Previously we have discussed at length the possible medications we could use for myotonia, and now that his GI symptoms have completely resolved we would be much more inclined to try mexiletine.  We could also try lamotrigine which she has used for mood and behaviors historically and tolerated them well over a years time, but mexiletine is usually more efficacious in terms of myotonia symptom relief.      ROS: A 10-point ROS was performed as per HPI.    PMH:  Past Medical History:   Diagnosis Date    Abdominal pain 01/03/2017    Anxiety     h/o panic attacks    Cerebral infarction (H) 06/20/2012    Depression     resolved    Depressive disorder 2004    Diverticulosis     frequent diverticulitis    Diverticulosis of colon - frequent diverticulitis 12/12/2013    GERD (gastroesophageal reflux disease) 11/09/2013    H/O TIA (transient ischemic attack) and stroke 01/03/2017    Hiatal hernia 06/20/2012    History of stroke with residual effects 2004    TIA / Testing Confirmed Frontal Lobe Impairment    Hyperlipidemia with target LDL less than 100 12/12/2013    Diagnosis updated by automated process. Provider to review and confirm.    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    Skin cancer     squamous cell hands, arms and legs    Stroke (H) 2004    h/o PFO     Past Surgical History:   Procedure Laterality Date    ABDOMEN SURGERY  3/2014    Incarcerated spigelian hernia recurence 12/30/2016    APPENDECTOMY  3/2014    APPENDECTOMY      CARDIAC SURGERY  2007    PFO closure thru femoral artery    COLONOSCOPY  2011/ 2014    Polyps    COLONOSCOPY N/A 5/10/2022    Procedure: COLONOSCOPY;  Surgeon: Bro Dupont MD, MD;  Location: [facility] OR    DENTAL SURGERY  1973    wisodm teeth    ESOPHAGOSCOPY, GASTROSCOPY, DUODENOSCOPY (EGD), COMBINED N/A 5/10/2022    Procedure: ESOPHAGOGASTRODUODENOSCOPY WITH BIOPSIES;  Surgeon: Bro Dupont MD, MD;  Location: [facility] OR    GI SURGERY  2010/2014    upper GI    HERNIA REPAIR  3/2014    Incarcerated spigelian hernia/ recurence 12/30/2016    HERNIA REPAIR      LAPAROSCOPIC NISSEN FUNDOPLICATION N/A 4/10/2023    Procedure: FUNDOPLICATION, PARAESOPHAGEAL HERNIA REPAIR, ROBOT-ASSISTED, LAPAROSCOPIC, USING DA RAMÓN XI;  Surgeon: Vasquez Blackwood DO;  Location: [facility] OR    PATENT FORAMEN OVALE CLOSURE  07/2007       Allergies:  Allergies   Allergen Reactions    Clopidogrel Hives, Swelling, Rash and GI Disturbance     Plavix      Nsaids      History of ulcers  Other reaction(s): vomiting       Medications:      Current Outpatient Medications:     aspirin 81 MG tablet, Take 1 tablet by mouth daily., Disp: , Rfl:     atorvastatin (LIPITOR) 20 MG tablet, Take 1 tablet (20 mg) by mouth daily, Disp: 90 tablet, Rfl: 4    clonazePAM (KLONOPIN) 0.5 MG tablet, Take 0.5 mg by mouth daily as needed for anxiety or sleep, Disp: , Rfl:     co-enzyme Q-10 100 MG CAPS capsule, Take 100 mg by mouth daily, Disp: , Rfl:     DULoxetine (CYMBALTA) 20 MG capsule, Take 20 mg by mouth daily, Disp: ,  "Rfl:     gabapentin (NEURONTIN) 100 MG capsule, Take 1 capsule (100 mg) by mouth 2 times daily, Disp: 180 capsule, Rfl: 2    polyethylene glycol (MIRALAX) 17 g packet, Take 1 packet by mouth daily, Disp: , Rfl:     acetaminophen (TYLENOL) 500 MG tablet, Take 1,000 mg by mouth daily as needed for mild pain, Disp: , Rfl:     Social History:  Social History     Tobacco Use    Smoking status: Former     Types: Cigarettes     Start date: 1977     Quit date: 2012     Years since quittin.9    Smokeless tobacco: Never    Tobacco comments:     Was a social smoker   Substance Use Topics    Alcohol use: Not Currently     Comment: rare, 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       Family History:    Family History   Problem Relation Age of Onset    Cerebrovascular Disease Mother         stroke    Cancer Father         Lung Caner          Physical Examination  Vitals: /81 (BP Location: Right arm, Patient Position: Sitting, Cuff Size: Adult Regular)   Pulse 73   Ht 1.626 m (5' 4.02\")   Wt 56.7 kg (125 lb)   SpO2 100%   BMI 21.45 kg/m      General: Alert, interactive; NAD, cooperative  HEENT: Normocephalic, atraumatic, nares patent, no oral lesions  Pulm: No increased work of breathing  Extremities: Warm and well perfused, peripheral pulses present, no edema  Musculoskeletal: No deformities of feet, hands, or limbs  Skin: Not jaundiced, no rash, no ecchymoses  Psych: Normal mood and affect    Neurologic:   Mental status: Attentive, interactive; Recalls remote and recent history with accuracy  Speech/Language: Fluent speech without paraphasic errors; No dysarthria  Cranial nerves: Visual fields intact to confrontation, Eyes conjugate on neutral gaze, Pupils 4mm and brisk, EOMI w/ normal and smooth pursuit, face expression symmetric, facial sensation intact and symmetric, hearing intact to conversation, shoulder shrug strong, palate rise symmetric, tongue/uvula " midline.    Motor:   Bulk: Appropriate, no atrophy or hypertrophy appreciated  Tone: Appropriate, occasional paratonia  Spontaneous movements: No myoclonus, asterixis, or fasciculations    *Myotonia is very subtle in the hands but noticeable after repetitive tight closure of the eyes  Power: *All strength assessments based on MRC grading  Pronator drift absent.    Right Left   Arm abduction 5 5   Elbow Flex 5 5   Elbow Extension 5 5   Wrist Extension 5 5   FDI  5 5   APB 5 5   Fingertip Flexion 5 5     5 5   Hip Flexion 5 5   Knee Flexion 5 5   Knee Extension 5 5   Dorsiflexion  5 5   Plantarflexion 5 5     Reflexes:    Right Left   Triceps 2 2   Biceps 2 1   BR 2 2   Patella 3 3   Achilles  1 1   Plantar down down   No crossed adductors or spread. No clonus    Coordination:   FNF intact bilaterally without dysmetria  Normal heel-shin test bilaterally  Finger tapping with normal amplitude and frequency    Gait/Station:   Able to rise unassisted from a seated position.   Gait shows normal width, stride length, turn, and arm swing. Station appropriate while sitting and standing      IMPRESSION/PLAN:    Vasquez Ann is a 73 year old man who presents to the [facility] neuromuscular clinic in follow-up of paramyotonia congenita.  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  He continues to have tightness and knotting up of his upper trunk including shoulder girdle muscles, neck, back, PACs, and even in the ribs.  This is symmetric and mostly described as achiness in this distribution.  We are presuming that this is related to myotonia and may improve with sodium channel blockers.  Due to complete resolution of his GI symptoms recently, we will attempt to try mexiletine.    Plan:    Start mexiletine for the myotonia, 150 mg twice a day. You can increase to 150 mg three times a day after two weeks  Please send me a [employer] message in about a month. You  don't need an in person follow up appointment to assess treatment response.     Follow up in Clinic in 1 year      Patient seen and discussed with attending Dr. Jeronimo Quinones MD  Neuromuscular Medicine Fellow, PGY-5  [facility]      ATTENDING ADDENDUM: Patient seen and examined with Neuromuscular Fellow Dr. Quinones at the [facility] on December 11, 2023.  I agree with his impression and plan.  Billing is level 4 (moderate) based on: #1 problems assessed: One chronic disorder with progression, exacerbation, or side effects of treatment (paramyotonia congenita), #2 Risk: Prescription drug management.    Glenn Decker MD, FAAN

## 2023-12-11 NOTE — LETTER
2023       RE: Vasquez Ann  [address]  [address]  [place] [address]       Dear Colleague,    Thank you for referring your patient, Vasquez Ann, to the [facility] NEUROLOGY CLINIC [place] at [facility]. Please see a copy of my visit note below.    [facility], [facility] [place]  Neurology Progress Note - Neuromuscular Division  [facility]    Patient Name:  Vasquez Ann    MRN:  [id number]   :  1950  Date of Service:  2023  Primary care provider:  Enrrique Reeves      History of Present Illness:   Vasquez Ann is a 73 year old man who presents to the [facility] neuromuscular clinic in follow-up of paramyotonia congenita.  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    He was last seen in our clinic on 2023.  At that time he was continuing to have symptomatic myotonia and did not respond to gabapentin historically.  He was also having pain and discomfort that is relatively new, it was in the thoracic and back, and this include paresthesias that would extend toward the midline.  He mostly describes a burning and electric sensation.  He denies any myotonia, cramps, weakness of the torso or abdomen associated with this.  No loss of bowel or bladder function.  In order to further characterize his pain and symptoms MRI of the C-spine and thoracic spine were ordered which did not show any cord signal change of the cervical or thoracic spine, and essentially ruled out myelopathy.  There was incidental finding of foraminal stenosis that was severe at a few sites.    At baseline he is taking gabapentin 100 mg 3 times daily for pain and duloxetine 40 mg daily that was started years ago for mood.  Since last appointment he also underwent procedure for hiatal hernia and Nissen fundoplication which has almost completely  "resolved all of his GI symptoms including GERD, reflux, constipation, diarrhea, and GI upset.  He basically says this is no longer symptomatic at all.    In terms of his pain and other symptoms, he continues to have achiness and pain of the shoulder girdles, neck, back, and spreads into his anterior chest and pectoral muscles.  It also involves the sternum and muscles of his ribs he thinks.  He describes this achiness that also has qualities of \"tightness\" and \"knotting up\".  He thinks that this is different than the chest pain that he had historically that was related to PFO closure as that was more pain of just the left side and was deeper.  He also denies shortness of breath at this time.  He has occasional numbness and paresthesias of his left biceps and fingers, but this has been chronic and unchanged over years time.  He denies any sensation or weakness of the legs.  Previously we have discussed at length the possible medications we could use for myotonia, and now that his GI symptoms have completely resolved we would be much more inclined to try mexiletine.  We could also try lamotrigine which she has used for mood and behaviors historically and tolerated them well over a years time, but mexiletine is usually more efficacious in terms of myotonia symptom relief.      ROS: A 10-point ROS was performed as per HPI.    PMH:  Past Medical History:   Diagnosis Date    Abdominal pain 01/03/2017    Anxiety     h/o panic attacks    Cerebral infarction (H) 06/20/2012    Depression     resolved    Depressive disorder 2004    Diverticulosis     frequent diverticulitis    Diverticulosis of colon - frequent diverticulitis 12/12/2013    GERD (gastroesophageal reflux disease) 11/09/2013    H/O TIA (transient ischemic attack) and stroke 01/03/2017    Hiatal hernia 06/20/2012    History of stroke with residual effects 2004    TIA / Testing Confirmed Frontal Lobe Impairment    Hyperlipidemia with target LDL less than 100 " 12/12/2013    Diagnosis updated by automated process. Provider to review and confirm.    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    Skin cancer     squamous cell hands, arms and legs    Stroke (H) 2004    h/o PFO     Past Surgical History:   Procedure Laterality Date    ABDOMEN SURGERY  3/2014    Incarcerated spigelian hernia recurence 12/30/2016    APPENDECTOMY  3/2014    APPENDECTOMY      CARDIAC SURGERY  2007    PFO closure thru femoral artery    COLONOSCOPY  2011/ 2014    Polyps    COLONOSCOPY N/A 5/10/2022    Procedure: COLONOSCOPY;  Surgeon: Bro Dupont MD, MD;  Location: [facility] OR    DENTAL SURGERY  1973    wisodm teeth    ESOPHAGOSCOPY, GASTROSCOPY, DUODENOSCOPY (EGD), COMBINED N/A 5/10/2022    Procedure: ESOPHAGOGASTRODUODENOSCOPY WITH BIOPSIES;  Surgeon: Bro Dupont MD, MD;  Location: [facility] OR    GI SURGERY  2010/2014    upper GI    HERNIA REPAIR  3/2014    Incarcerated spigelian hernia/ recurence 12/30/2016    HERNIA REPAIR      LAPAROSCOPIC NISSEN FUNDOPLICATION N/A 4/10/2023    Procedure: FUNDOPLICATION, PARAESOPHAGEAL HERNIA REPAIR, ROBOT-ASSISTED, LAPAROSCOPIC, USING DA RAMÓN XI;  Surgeon: Vasquez Blackwood DO;  Location: [facility] OR    PATENT FORAMEN OVALE CLOSURE  07/2007       Allergies:  Allergies   Allergen Reactions    Clopidogrel Hives, Swelling, Rash and GI Disturbance     Plavix      Nsaids      History of ulcers  Other reaction(s): vomiting       Medications:      Current Outpatient Medications:     aspirin 81 MG tablet, Take 1 tablet by mouth daily., Disp: , Rfl:     atorvastatin (LIPITOR) 20  "MG tablet, Take 1 tablet (20 mg) by mouth daily, Disp: 90 tablet, Rfl: 4    clonazePAM (KLONOPIN) 0.5 MG tablet, Take 0.5 mg by mouth daily as needed for anxiety or sleep, Disp: , Rfl:     co-enzyme Q-10 100 MG CAPS capsule, Take 100 mg by mouth daily, Disp: , Rfl:     DULoxetine (CYMBALTA) 20 MG capsule, Take 20 mg by mouth daily, Disp: , Rfl:     gabapentin (NEURONTIN) 100 MG capsule, Take 1 capsule (100 mg) by mouth 2 times daily, Disp: 180 capsule, Rfl: 2    polyethylene glycol (MIRALAX) 17 g packet, Take 1 packet by mouth daily, Disp: , Rfl:     acetaminophen (TYLENOL) 500 MG tablet, Take 1,000 mg by mouth daily as needed for mild pain, Disp: , Rfl:     Social History:  Social History     Tobacco Use    Smoking status: Former     Types: Cigarettes     Start date: 1977     Quit date: 2012     Years since quittin.9    Smokeless tobacco: Never    Tobacco comments:     Was a social smoker   Substance Use Topics    Alcohol use: Not Currently     Comment: rare, 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       Family History:    Family History   Problem Relation Age of Onset    Cerebrovascular Disease Mother         stroke    Cancer Father         Lung Caner          Physical Examination  Vitals: /81 (BP Location: Right arm, Patient Position: Sitting, Cuff Size: Adult Regular)   Pulse 73   Ht 1.626 m (5' 4.02\")   Wt 56.7 kg (125 lb)   SpO2 100%   BMI 21.45 kg/m      General: Alert, interactive; NAD, cooperative  HEENT: Normocephalic, atraumatic, nares patent, no oral lesions  Pulm: No increased work of breathing  Extremities: Warm and well perfused, peripheral pulses present, no edema  Musculoskeletal: No deformities of feet, hands, or limbs  Skin: Not jaundiced, no rash, no ecchymoses  Psych: Normal mood and affect    Neurologic:   Mental status: Attentive, interactive; Recalls remote and recent history with accuracy  Speech/Language: Fluent speech without " paraphasic errors; No dysarthria  Cranial nerves: Visual fields intact to confrontation, Eyes conjugate on neutral gaze, Pupils 4mm and brisk, EOMI w/ normal and smooth pursuit, face expression symmetric, facial sensation intact and symmetric, hearing intact to conversation, shoulder shrug strong, palate rise symmetric, tongue/uvula midline.    Motor:   Bulk: Appropriate, no atrophy or hypertrophy appreciated  Tone: Appropriate, occasional paratonia  Spontaneous movements: No myoclonus, asterixis, or fasciculations    *Myotonia is very subtle in the hands but noticeable after repetitive tight closure of the eyes  Power: *All strength assessments based on MRC grading  Pronator drift absent.    Right Left   Arm abduction 5 5   Elbow Flex 5 5   Elbow Extension 5 5   Wrist Extension 5 5   FDI  5 5   APB 5 5   Fingertip Flexion 5 5     5 5   Hip Flexion 5 5   Knee Flexion 5 5   Knee Extension 5 5   Dorsiflexion  5 5   Plantarflexion 5 5     Reflexes:    Right Left   Triceps 2 2   Biceps 2 1   BR 2 2   Patella 3 3   Achilles  1 1   Plantar down down   No crossed adductors or spread. No clonus    Coordination:   FNF intact bilaterally without dysmetria  Normal heel-shin test bilaterally  Finger tapping with normal amplitude and frequency    Gait/Station:   Able to rise unassisted from a seated position.   Gait shows normal width, stride length, turn, and arm swing. Station appropriate while sitting and standing      IMPRESSION/PLAN:    Vasquez Ann is a 73 year old man who presents to the [facility] neuromuscular clinic in follow-up of paramyotonia congenita.  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  He continues to have tightness and knotting up of his upper trunk including shoulder girdle muscles, neck, back, PACs, and even in the ribs.  This is symmetric and mostly described as achiness in this distribution.  We are presuming that this is related to  myotonia and may improve with sodium channel blockers.  Due to complete resolution of his GI symptoms recently, we will attempt to try mexiletine.    Plan:    Start mexiletine for the myotonia, 150 mg twice a day. You can increase to 150 mg three times a day after two weeks  Please send me a [employer] message in about a month. You don't need an in person follow up appointment to assess treatment response.     Follow up in Clinic in 1 year      Patient seen and discussed with attending Dr. Jeronimo Quinones MD  Neuromuscular Medicine Fellow, PGY-5  [facility]    ATTENDING ADDENDUM: Patient seen and examined with Neuromuscular Fellow Dr. Quinones at the [facility] clinic on December 11, 2023.  I agree with his impression and plan.  Billing is level 4 (moderate) based on: #1 problems assessed: One chronic disorder with progression, exacerbation, or side effects of treatment (paramyotonia congenita), #2 Risk: Prescription drug management.        Again, thank you for allowing me to participate in the care of your patient.      Sincerely,    Glenn Decker MD

## 2023-12-11 NOTE — PATIENT INSTRUCTIONS
Start mexiletine for the myotonia, 1 pill twice a day. You can increase to three times a day after two weeks  Please send me a [employer] message in about a month. You don't need an in person follow up appointment to assess treatment response.     Follow up in Clinic in 1 year

## 2024-03-06 DIAGNOSIS — G71.19 PARAMYOTONIA CONGENITA: ICD-10-CM

## 2024-03-06 RX ORDER — MEXILETINE HYDROCHLORIDE 150 MG/1
CAPSULE ORAL
Qty: 90 CAPSULE | Refills: 1 | Status: SHIPPED | OUTPATIENT
Start: 2024-03-06 | End: 2024-04-03

## 2024-03-17 ENCOUNTER — HEALTH MAINTENANCE LETTER (OUTPATIENT)
Age: 74
End: 2024-03-17

## 2024-04-03 ENCOUNTER — LAB REQUISITION (OUTPATIENT)
Dept: LAB | Facility: CLINIC | Age: 74
End: 2024-04-03
Payer: MEDICARE

## 2024-04-03 DIAGNOSIS — E78.5 HYPERLIPIDEMIA, UNSPECIFIED: ICD-10-CM

## 2024-04-03 DIAGNOSIS — G71.19 PARAMYOTONIA CONGENITA: ICD-10-CM

## 2024-04-03 DIAGNOSIS — Z12.5 ENCOUNTER FOR SCREENING FOR MALIGNANT NEOPLASM OF PROSTATE: ICD-10-CM

## 2024-04-03 DIAGNOSIS — E67.3 HYPERVITAMINOSIS D: ICD-10-CM

## 2024-04-03 PROCEDURE — 82306 VITAMIN D 25 HYDROXY: CPT | Mod: ORL | Performed by: FAMILY MEDICINE

## 2024-04-03 PROCEDURE — G0103 PSA SCREENING: HCPCS | Mod: ORL | Performed by: FAMILY MEDICINE

## 2024-04-03 PROCEDURE — 80061 LIPID PANEL: CPT | Mod: ORL | Performed by: FAMILY MEDICINE

## 2024-04-03 RX ORDER — MEXILETINE HYDROCHLORIDE 150 MG/1
CAPSULE ORAL
Qty: 270 CAPSULE | Refills: 1 | Status: SHIPPED | OUTPATIENT
Start: 2024-04-03

## 2024-04-04 LAB
CHOLEST SERPL-MCNC: 198 MG/DL
FASTING STATUS PATIENT QL REPORTED: ABNORMAL
HDLC SERPL-MCNC: 58 MG/DL
LDLC SERPL CALC-MCNC: 126 MG/DL
NONHDLC SERPL-MCNC: 140 MG/DL
PSA SERPL DL<=0.01 NG/ML-MCNC: 0.78 NG/ML (ref 0–6.5)
TRIGL SERPL-MCNC: 72 MG/DL
VIT D+METAB SERPL-MCNC: 51 NG/ML (ref 20–50)

## 2024-05-07 ENCOUNTER — APPOINTMENT (OUTPATIENT)
Dept: CT IMAGING | Facility: HOSPITAL | Age: 74
End: 2024-05-07
Attending: EMERGENCY MEDICINE
Payer: MEDICARE

## 2024-05-07 ENCOUNTER — HOSPITAL ENCOUNTER (EMERGENCY)
Facility: HOSPITAL | Age: 74
Discharge: HOME | End: 2024-05-07
Attending: EMERGENCY MEDICINE | Admitting: EMERGENCY MEDICINE
Payer: MEDICARE

## 2024-05-07 VITALS
TEMPERATURE: 98.6 F | BODY MASS INDEX: 21.26 KG/M2 | WEIGHT: 120 LBS | OXYGEN SATURATION: 98 % | RESPIRATION RATE: 16 BRPM | SYSTOLIC BLOOD PRESSURE: 135 MMHG | DIASTOLIC BLOOD PRESSURE: 69 MMHG | HEART RATE: 73 BPM | HEIGHT: 63 IN

## 2024-05-07 DIAGNOSIS — R31.9 HEMATURIA, UNSPECIFIED TYPE: ICD-10-CM

## 2024-05-07 LAB
ALBUMIN UR-MCNC: 20 MG/DL
ANION GAP SERPL CALCULATED.3IONS-SCNC: 6 MMOL/L (ref 7–15)
APPEARANCE UR: ABNORMAL
BASOPHILS # BLD AUTO: 0.1 10E3/UL (ref 0–0.2)
BASOPHILS NFR BLD AUTO: 1 %
BILIRUB UR QL STRIP: NEGATIVE
BUN SERPL-MCNC: 15.2 MG/DL (ref 8–23)
CALCIUM SERPL-MCNC: 9.5 MG/DL (ref 8.8–10.2)
CHLORIDE SERPL-SCNC: 105 MMOL/L (ref 98–107)
COLOR UR AUTO: YELLOW
CREAT SERPL-MCNC: 0.8 MG/DL (ref 0.67–1.17)
DEPRECATED HCO3 PLAS-SCNC: 28 MMOL/L (ref 22–29)
EGFRCR SERPLBLD CKD-EPI 2021: >90 ML/MIN/1.73M2
EOSINOPHIL # BLD AUTO: 0.1 10E3/UL (ref 0–0.7)
EOSINOPHIL NFR BLD AUTO: 1 %
ERYTHROCYTE [DISTWIDTH] IN BLOOD BY AUTOMATED COUNT: 11.9 % (ref 10–15)
GLUCOSE SERPL-MCNC: 124 MG/DL (ref 70–99)
GLUCOSE UR STRIP-MCNC: NEGATIVE MG/DL
HCT VFR BLD AUTO: 44.9 % (ref 40–53)
HGB BLD-MCNC: 15.1 G/DL (ref 13.3–17.7)
HGB UR QL STRIP: ABNORMAL
IMM GRANULOCYTES # BLD: 0 10E3/UL
IMM GRANULOCYTES NFR BLD: 0 %
KETONES UR STRIP-MCNC: NEGATIVE MG/DL
LEUKOCYTE ESTERASE UR QL STRIP: ABNORMAL
LYMPHOCYTES # BLD AUTO: 2.3 10E3/UL (ref 0.8–5.3)
LYMPHOCYTES NFR BLD AUTO: 25 %
MCH RBC QN AUTO: 31.3 PG (ref 26.5–33)
MCHC RBC AUTO-ENTMCNC: 33.6 G/DL (ref 31.5–36.5)
MCV RBC AUTO: 93 FL (ref 78–100)
MONOCYTES # BLD AUTO: 0.8 10E3/UL (ref 0–1.3)
MONOCYTES NFR BLD AUTO: 9 %
NEUTROPHILS # BLD AUTO: 5.8 10E3/UL (ref 1.6–8.3)
NEUTROPHILS NFR BLD AUTO: 64 %
NITRATE UR QL: NEGATIVE
NRBC # BLD AUTO: 0 10E3/UL
NRBC BLD AUTO-RTO: 0 /100
PH UR STRIP: 5.5 [PH] (ref 5–7)
PLATELET # BLD AUTO: 238 10E3/UL (ref 150–450)
POTASSIUM SERPL-SCNC: 4.3 MMOL/L (ref 3.4–5.3)
RBC # BLD AUTO: 4.82 10E6/UL (ref 4.4–5.9)
RBC URINE: >182 /HPF
SODIUM SERPL-SCNC: 139 MMOL/L (ref 135–145)
SP GR UR STRIP: 1.01 (ref 1–1.03)
UROBILINOGEN UR STRIP-MCNC: <2 MG/DL
WBC # BLD AUTO: 9.1 10E3/UL (ref 4–11)
WBC URINE: 0 /HPF

## 2024-05-07 PROCEDURE — 80048 BASIC METABOLIC PNL TOTAL CA: CPT | Performed by: EMERGENCY MEDICINE

## 2024-05-07 PROCEDURE — 81003 URINALYSIS AUTO W/O SCOPE: CPT | Performed by: STUDENT IN AN ORGANIZED HEALTH CARE EDUCATION/TRAINING PROGRAM

## 2024-05-07 PROCEDURE — 81001 URINALYSIS AUTO W/SCOPE: CPT | Performed by: EMERGENCY MEDICINE

## 2024-05-07 PROCEDURE — 74176 CT ABD & PELVIS W/O CONTRAST: CPT | Mod: MG

## 2024-05-07 PROCEDURE — 36415 COLL VENOUS BLD VENIPUNCTURE: CPT | Performed by: EMERGENCY MEDICINE

## 2024-05-07 PROCEDURE — 99284 EMERGENCY DEPT VISIT MOD MDM: CPT | Mod: 25

## 2024-05-07 PROCEDURE — 85048 AUTOMATED LEUKOCYTE COUNT: CPT | Performed by: EMERGENCY MEDICINE

## 2024-05-07 ASSESSMENT — COLUMBIA-SUICIDE SEVERITY RATING SCALE - C-SSRS
2. HAVE YOU ACTUALLY HAD ANY THOUGHTS OF KILLING YOURSELF IN THE PAST MONTH?: NO
1. IN THE PAST MONTH, HAVE YOU WISHED YOU WERE DEAD OR WISHED YOU COULD GO TO SLEEP AND NOT WAKE UP?: NO
6. HAVE YOU EVER DONE ANYTHING, STARTED TO DO ANYTHING, OR PREPARED TO DO ANYTHING TO END YOUR LIFE?: NO

## 2024-05-07 ASSESSMENT — ACTIVITIES OF DAILY LIVING (ADL)
ADLS_ACUITY_SCORE: 35
ADLS_ACUITY_SCORE: 35

## 2024-05-07 NOTE — ED PROVIDER NOTES
Emergency Department Encounter     Evaluation Date & Time:   5/7/2024  3:28 PM    CHIEF COMPLAINT:  Hematuria      Triage Note:Hematuria x 3 days.  States had left low back pain initially which resolved on its own.  States hx of kidney stones. Takes baby ASA.               ED COURSE & MEDICAL DECISION MAKING:     Pt here for ongoing gross hematuria since Saturday.  Pt developed left side/flank pain Saturday that lasted hours, now entirely resolved, but still having blood in urine, so he came in under advisement from his primary clinic.  Pt has a history of ureteral stones in the past.  He's pain free, no dysuria, actually ran 4 miles today.  Will check labs given ongoing hematuria, CT stone protocol and reassess. Will need further rule out other pathology such as obstructing stone, kidney mass, bladder mass.      ED Course as of 05/07/24 1855   Tue May 07, 2024   1514 I met the patient in the waiting room and performed my initial interview and exam.    1654 Serum labs reassuring. Awaiting CT.   1705 CT neg for obstructing stone or other acute pathology.     1720 Rechecked and updated patient on labs and imaging. We discussed plan for discharge and all questions were answered.    Pt remains entirely pain free. Reports hematuria is intermittent.  Suspect he likely had a ureteral stone on Saturday, passed, but still having some intermittent blood in urine.  If stone related, should resolve. Pt advised on follow up with urology if bleeding doesn't entirely resolve for outpatient evaluation, otherwise, return here for recurrent uncontrolled pain.         Medical Decision Making    History:  Supplemental history from: Documented in chart  External Record(s) reviewed: Documented in chart    Work Up:  Chart documentation includes differential considered and any EKGs or imaging independently interpreted by provider, where specified.  In additional to work up documented, I considered the following work up: Documented in  chart, if applicable.    External consultation:  Discussion of management with another provider: Documented in chart, if applicable    Complicating factors:  Care impacted by chronic illness: Hyperlipidemia and Other: stroke  Care affected by social determinants of health: N/A    Disposition considerations: Discharge. No recommendations on prescription strength medication(s). I considered admission, but ultimately discharged patient after reassuring workup, no ongoing pain, outpatient follow up and expected course instructions.      At the conclusion of the encounter I discussed the results of all the tests and the disposition. The questions were answered. The patient or family acknowledged understanding and was agreeable with the care plan.      MEDICATIONS GIVEN IN THE EMERGENCY DEPARTMENT:  Medications - No data to display    NEW PRESCRIPTIONS STARTED AT TODAY'S ED VISIT:  Discharge Medication List as of 5/7/2024  5:34 PM          HPI   HPI     Vasquez Ann is a 73 year old male with a pertinent history of nephrolithiases, HLD, stroke, hernia, who presents to this ED via walk-in for evaluation of hematuria.    3 days ago, the patient developed left sided flank pain that felt similar to previous pain with kidney stones. The pain went away that day, but the patient since noticed that his urine was increasingly pink with what he assumed was blood. He had no accompanying dysuria, fever, nausea, vomiting, or abdominal pain. He also denies any recent traumas, falls, or injuries.    The patient has a history of hernias and nephrolithiases on his left side. Last year, the patient was told that a scan revealed a left-sided kidney stone.    REVIEW OF SYSTEMS:  See HPI      Medical History     Past Medical History:   Diagnosis Date    Abdominal pain 01/03/2017    Anxiety     Cerebral infarction (H) 06/20/2012    Depression     Depressive disorder 2004    Diverticulosis     Diverticulosis of colon - frequent  diverticulitis 12/12/2013    GERD (gastroesophageal reflux disease) 11/09/2013    H/O TIA (transient ischemic attack) and stroke 01/03/2017    Hiatal hernia 06/20/2012    History of stroke with residual effects 2004    Hyperlipidemia with target LDL less than 100 12/12/2013    LUQ abdominal pain 09/12/2014    Myotonia     Neck pain 06/28/2012    Panic disorder without agoraphobia 08/03/2012    PFO (patent foramen ovale) 06/20/2012    PUD (peptic ulcer disease)     S/P repair of ventral hernia 01/03/2017    SCC (squamous cell carcinoma), multiple 02/27/2014    Sedative, 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    Skin cancer     Stroke (H) 2004       Past Surgical History:   Procedure Laterality Date    ABDOMEN SURGERY  3/2014    Incarcerated spigelian hernia recurence 12/30/2016    APPENDECTOMY  3/2014    APPENDECTOMY      CARDIAC SURGERY  2007    PFO closure thru femoral artery    COLONOSCOPY  2011/ 2014    Polyps    COLONOSCOPY N/A 5/10/2022    Procedure: COLONOSCOPY;  Surgeon: Bro Dupont MD, MD;  Location: [facility] OR    DENTAL SURGERY  1973    wisodm teeth    ESOPHAGOSCOPY, GASTROSCOPY, DUODENOSCOPY (EGD), COMBINED N/A 5/10/2022    Procedure: ESOPHAGOGASTRODUODENOSCOPY WITH BIOPSIES;  Surgeon: Bro Dupont MD, MD;  Location: [facility] OR    GI SURGERY  2010/2014    upper GI    HERNIA REPAIR  3/2014    Incarcerated spigelian hernia/ recurence 12/30/2016    HERNIA REPAIR      LAPAROSCOPIC NISSEN FUNDOPLICATION N/A 4/10/2023    Procedure: FUNDOPLICATION, PARAESOPHAGEAL HERNIA REPAIR, ROBOT-ASSISTED, LAPAROSCOPIC, USING DA RAMÓN XI;  Surgeon: Vasquez Blackwood DO;  [place] [facility] [place]    PATENT FORAMEN OVALE CLOSURE  07/2007       Family History   Problem Relation Age of Onset    Cerebrovascular Disease Mother         stroke    Cancer Father         Lung Caner 1993       Social History     Tobacco Use    Smoking status: Former     Current packs/day: 0.00     Types: Cigarettes     " Start date: 1977     Quit date: 2012     Years since quittin.3    Smokeless tobacco: Never    Tobacco comments:     Was a social smoker   Substance Use Topics    Alcohol use: Not Currently     Comment: rare, 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   Constitutional:       General: He is not in acute distress.     Appearance: Normal appearance.   HENT:      Head: Normocephalic and atraumatic.      Nose: Nose normal.      Mouth/Throat:      Mouth: Mucous membranes are moist.   Eyes:      Pupils: Pupils are equal, round, and reactive to light.   Cardiovascular:      Rate and Rhythm: Normal rate and regular rhythm.      Pulses: Normal pulses.           Radial pulses are 2+ on the right side and 2+ on the left side.        Dorsalis pedis pulses are 2+ on the right side and 2+ on the left side.   Pulmonary:      Effort: Pulmonary effort is normal. No respiratory distress.      Breath sounds: Normal breath sounds.   Abdominal:      Palpations: Abdomen is soft.      Tenderness: There is no abdominal tenderness. There is no right CVA tenderness or left CVA tenderness.   Musculoskeletal:      Cervical back: Full passive range of motion without pain and neck supple.      Comments: No calf tenderness or swelling b/l   Skin:     General: Skin is warm.      Findings: No rash.   Neurological:      General: No focal " deficit present.      Mental Status: He is alert. Mental status is at baseline.      Comments: Fluent speech, no acute lateralizing deficits   Psychiatric:         Mood and Affect: Mood normal.         Behavior: Behavior normal.           Results     LAB:  All pertinent labs reviewed and interpreted  Labs Ordered and Resulted from Time of ED Arrival to Time of ED Departure   ROUTINE UA WITH MICROSCOPIC REFLEX TO CULTURE - Abnormal       Result Value    Color Urine Yellow      Appearance Urine Turbid (*)     Glucose Urine Negative      Bilirubin Urine Negative      Ketones Urine Negative      Specific Gravity Urine 1.015      Blood Urine >1.0 mg/dL (*)     pH Urine 5.5      Protein Albumin Urine 20 (*)     Urobilinogen Urine <2.0      Nitrite Urine Negative      Leukocyte Esterase Urine 25 Elizabeth/uL (*)     RBC Urine >182 (*)     WBC Urine 0     BASIC METABOLIC PANEL - Abnormal    Sodium 139      Potassium 4.3      Chloride 105      Carbon Dioxide (CO2) 28      Anion Gap 6 (*)     Urea Nitrogen 15.2      Creatinine 0.80      GFR Estimate >90      Calcium 9.5      Glucose 124 (*)    CBC WITH PLATELETS AND DIFFERENTIAL    WBC Count 9.1      RBC Count 4.82      Hemoglobin 15.1      Hematocrit 44.9      MCV 93      MCH 31.3      MCHC 33.6      RDW 11.9      Platelet Count 238      % Neutrophils 64      % Lymphocytes 25      % Monocytes 9      % Eosinophils 1      % Basophils 1      % Immature Granulocytes 0      NRBCs per 100 WBC 0      Absolute Neutrophils 5.8      Absolute Lymphocytes 2.3      Absolute Monocytes 0.8      Absolute Eosinophils 0.1      Absolute Basophils 0.1      Absolute Immature Granulocytes 0.0      Absolute NRBCs 0.0         RADIOLOGY:  Abd/pelvis CT no contrast - Stone Protocol   Final Result   IMPRESSION:    1.  No hydronephrosis or ureteral calculus.   2.  Nonobstructing bilateral renal calculi.                      ECG:  none    PROCEDURES:  Procedures:  none      FINAL IMPRESSION:    ICD-10-CM     1. Hematuria, unspecified type  R31.9           0 minutes of critical care time      I, Jamal Briones, am serving as a scribe to document services personally performed by Dr. Kashif Carr, based on my observations and the provider's statements to me. I, Kashif Carr, DO attest that Jamla Briones is acting in a scribe capacity, has observed my performance of the services and has documented them in accordance with my direction.      Kashif Carr, DO  Emergency Medicine  [facility] EMERGENCY DEPARTMENT  5/7/2024  3:33 PM          Kashif Carr MD  05/07/24 0799

## 2024-05-07 NOTE — ED TRIAGE NOTES
Hematuria x 3 days.  States had left low back pain initially which resolved on its own.  States hx of kidney stones. Takes baby ASA.

## 2024-05-07 NOTE — DISCHARGE INSTRUCTIONS
As we discussed, if this blood is related to a passed kidney stone, it should resolve.  Otherwise, follow up with urologist for outpatient evaluation if it doesn't resolve. Return to the ER for recurrent, uncontrolled pain or other new/worsening emergency concerns.

## 2025-01-21 DIAGNOSIS — M62.89 MYOTONIA: ICD-10-CM

## 2025-01-21 DIAGNOSIS — R25.2 MUSCLE CRAMPS: ICD-10-CM

## 2025-01-21 RX ORDER — GABAPENTIN 100 MG/1
100 CAPSULE ORAL 2 TIMES DAILY
Qty: 180 CAPSULE | Refills: 0 | Status: SHIPPED | OUTPATIENT
Start: 2025-01-21

## 2025-03-10 ENCOUNTER — OFFICE VISIT (OUTPATIENT)
Dept: NEUROLOGY | Facility: CLINIC | Age: 75
End: 2025-03-10
Payer: COMMERCIAL

## 2025-03-10 VITALS
DIASTOLIC BLOOD PRESSURE: 75 MMHG | HEIGHT: 63 IN | OXYGEN SATURATION: 98 % | WEIGHT: 139.9 LBS | SYSTOLIC BLOOD PRESSURE: 119 MMHG | BODY MASS INDEX: 24.79 KG/M2 | HEART RATE: 95 BPM

## 2025-03-10 DIAGNOSIS — G71.19 PARAMYOTONIA CONGENITA: Primary | ICD-10-CM

## 2025-03-10 RX ORDER — ALFUZOSIN HYDROCHLORIDE 10 MG/1
1 TABLET, EXTENDED RELEASE ORAL DAILY
COMMUNITY
Start: 2024-07-01

## 2025-03-10 ASSESSMENT — PAIN SCALES - GENERAL: PAINLEVEL_OUTOF10: NO PAIN (0)

## 2025-03-10 NOTE — PROGRESS NOTES
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  Previous EMG studies have shown widespread myotonia.  When I last saw him in December 2023 he was complaining of worsening tightness and knotting of his upper trunk, shoulder, back, and ribs that I felt was related to the myotonia.  I prescribed him the mexiletine 150 mg 3 times a day.  He previously was hesitant to start mexiletine due to GI issues. He stopped the drug a couple of months later because he did not feel he really needed it.  Overall he is in better shape this year and does not complain of a lot of muscle pain or stiffness.  He has gotten it under control with his current gabapentin and duloxetine dose.  Recently, he saw physical therapy who gave him a nice warm up exercise program for his neck stiffness and cervical radiculopathy, that the patient has found very helpful.    Last year he had some episodes of supraventricular tachycardia and his cardiologist ordered an echo with bubble study   (Result unknown).  He also had issues with hematuria and kidney stones, which are followed by Urology.  He was able to pass the stones and the hematuria resolved.    Current Outpatient Medications   Medication Sig Dispense Refill    acetaminophen (TYLENOL) 500 MG tablet Take 1,000 mg by mouth daily as needed for mild pain      aspirin 81 MG tablet Take 1 tablet by mouth daily.      atorvastatin (LIPITOR) 20 MG tablet Take 1 tablet (20 mg) by mouth daily 90 tablet 4    clonazePAM (KLONOPIN) 0.5 MG tablet Take 0.5 mg by mouth daily as needed for anxiety or sleep      co-enzyme Q-10 100 MG CAPS capsule Take 100 mg by mouth daily      DULoxetine (CYMBALTA) 20 MG capsule Take 20 mg by mouth daily      gabapentin (NEURONTIN) 100 MG capsule TAKE 1  "CAPSULE BY MOUTH TWICE A  capsule 0    mexiletine (MEXITIL) 150 MG capsule Take 1 capsule three times a day 270 capsule 1    polyethylene glycol (MIRALAX) 17 g packet Take 1 packet by mouth daily       No current facility-administered medications for this visit.     VITALS: /75 (BP Location: Left arm, Patient Position: Sitting, Cuff Size: Adult Regular)   Pulse 95   Ht 1.598 m (5' 2.91\")   Wt 63.5 kg (139 lb 14.4 oz)   SpO2 98%   BMI 24.85 kg/m      EXAM was not repeated.    In summary, Mr Seth's paramyotonia congenita symptoms are sufficiently controlled with his current gabapentin and duloxetine doses, and he does not need to take mexiletine.  We will simply refill his medications and have him follow-up in 1 year.  Otherwise, there are no new issues/concerns raised by the patient.      Sincerely,      Glenn Decker MD, FAAN    The longitudinal plan of care for the diagnosis(es)/condition(s) as documented were addressed during this visit. Due to the added complexity in care, I will continue to support Duran in the subsequent management and with ongoing continuity of care.    Billing MDM level 3 (low) based on 1) Problems assessed: One stable chronic disorder (paramyotonia congenita) and 2)Risk: prescription drug management, see above (prescribing gabapentin and duloxetine to pt through our office)      "

## 2025-03-10 NOTE — LETTER
3/10/2025       RE: Vasquez Ann  [address]  [place] [facility] [place] [address]     Dear Colleague,    Thank you for referring your patient, Vasquez Ann, to the [facility] [place] [facility]. Please see a copy of my visit note below.    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  Previous EMG studies have shown widespread myotonia.  When I last saw him in December 2023 he was complaining of worsening tightness and knotting of his upper trunk, shoulder, back, and ribs that I felt was related to the myotonia.  I prescribed him the mexiletine 150 mg 3 times a day.  He previously was hesitant to start mexiletine due to GI issues. He stopped the drug a couple of months later because he did not feel he really needed it.  Overall he is in better shape this year and does not complain of a lot of muscle pain or stiffness.  He has gotten it under control with his current gabapentin and duloxetine dose.  Recently, he saw physical therapy who gave him a nice warm up exercise program for his neck stiffness and cervical radiculopathy, that the patient has found very helpful.    Last year he had some episodes of supraventricular tachycardia and his cardiologist ordered an echo with bubble study   (Result unknown).  He also had issues with hematuria and kidney stones, which are followed by Urology.  He was able to pass the stones and the hematuria resolved.    Current Outpatient Medications   Medication Sig Dispense Refill     acetaminophen (TYLENOL) 500 MG tablet Take 1,000 mg by mouth daily as needed for mild pain       aspirin 81 MG tablet Take 1 tablet by mouth daily.       atorvastatin (LIPITOR) 20 MG  "tablet Take 1 tablet (20 mg) by mouth daily 90 tablet 4     clonazePAM (KLONOPIN) 0.5 MG tablet Take 0.5 mg by mouth daily as needed for anxiety or sleep       co-enzyme Q-10 100 MG CAPS capsule Take 100 mg by mouth daily       DULoxetine (CYMBALTA) 20 MG capsule Take 20 mg by mouth daily       gabapentin (NEURONTIN) 100 MG capsule TAKE 1 CAPSULE BY MOUTH TWICE A  capsule 0     mexiletine (MEXITIL) 150 MG capsule Take 1 capsule three times a day 270 capsule 1     polyethylene glycol (MIRALAX) 17 g packet Take 1 packet by mouth daily       No current facility-administered medications for this visit.     VITALS: /75 (BP Location: Left arm, Patient Position: Sitting, Cuff Size: Adult Regular)   Pulse 95   Ht 1.598 m (5' 2.91\")   Wt 63.5 kg (139 lb 14.4 oz)   SpO2 98%   BMI 24.85 kg/m      EXAM was not repeated.    In summary, Mr Ann's paramyotonia congenita symptoms are sufficiently controlled with his current gabapentin and duloxetine doses, and he does not need to take mexiletine.  We will simply refill his medications and have him follow-up in 1 year.  Otherwise, there are no new issues/concerns raised by the patient.      Sincerely,      Glenn Decker MD, FAAN    The longitudinal plan of care for the diagnosis(es)/condition(s) as documented were addressed during this visit. Due to the added complexity in care, I will continue to support Duran in the subsequent management and with ongoing continuity of care.    Billing MDM level 3 (low) based on 1) Problems assessed: One stable chronic disorder (paramyotonia congenita) and 2)Risk: prescription drug management, see above (prescribing gabapentin and duloxetine to pt through our office)        Again, thank you for allowing me to participate in the care of your patient.      Sincerely,    Glenn Decker MD    "

## 2025-03-10 NOTE — NURSING NOTE
Chief Complaint   Patient presents with    RECHECK     Muscular dystrophy      Vitals were taken and medications were reconciled.   Dallas Frye, EMT  2:45 PM

## 2025-03-22 ENCOUNTER — HEALTH MAINTENANCE LETTER (OUTPATIENT)
Age: 75
End: 2025-03-22

## 2025-05-03 ENCOUNTER — HEALTH MAINTENANCE LETTER (OUTPATIENT)
Age: 75
End: 2025-05-03

## 2025-05-19 DIAGNOSIS — M62.89 MYOTONIA: ICD-10-CM

## 2025-05-19 DIAGNOSIS — R25.2 MUSCLE CRAMPS: ICD-10-CM

## 2025-05-20 RX ORDER — GABAPENTIN 100 MG/1
100 CAPSULE ORAL 2 TIMES DAILY
Qty: 180 CAPSULE | Refills: 0 | Status: SHIPPED | OUTPATIENT
Start: 2025-05-20

## 2025-05-23 ENCOUNTER — LAB REQUISITION (OUTPATIENT)
Dept: LAB | Facility: CLINIC | Age: 75
End: 2025-05-23
Payer: MEDICARE

## 2025-05-23 DIAGNOSIS — Z12.5 ENCOUNTER FOR SCREENING FOR MALIGNANT NEOPLASM OF PROSTATE: ICD-10-CM

## 2025-05-23 DIAGNOSIS — E78.5 HYPERLIPIDEMIA, UNSPECIFIED: ICD-10-CM

## 2025-05-23 PROCEDURE — 80061 LIPID PANEL: CPT | Mod: ORL | Performed by: FAMILY MEDICINE

## 2025-05-23 PROCEDURE — G0103 PSA SCREENING: HCPCS | Mod: ORL | Performed by: FAMILY MEDICINE

## 2025-05-24 LAB
CHOLEST SERPL-MCNC: 122 MG/DL
FASTING STATUS PATIENT QL REPORTED: NO
HDLC SERPL-MCNC: 57 MG/DL
LDLC SERPL CALC-MCNC: 50 MG/DL
NONHDLC SERPL-MCNC: 65 MG/DL
PSA SERPL DL<=0.01 NG/ML-MCNC: 0.56 NG/ML (ref 0–6.5)
TRIGL SERPL-MCNC: 77 MG/DL

## 2025-08-16 DIAGNOSIS — M62.89 MYOTONIA: ICD-10-CM

## 2025-08-16 DIAGNOSIS — R25.2 MUSCLE CRAMPS: ICD-10-CM

## 2025-08-18 RX ORDER — GABAPENTIN 100 MG/1
100 CAPSULE ORAL 2 TIMES DAILY
Qty: 180 CAPSULE | Refills: 0 | Status: SHIPPED | OUTPATIENT
Start: 2025-08-18

## (undated) DEVICE — DECANTER VIAL 2006S

## (undated) DEVICE — SOL WATER IRRIG 1000ML BOTTLE 2F7114

## (undated) DEVICE — GLOVE BIOGEL PI ORTHOPRO SZ 7.5 47675

## (undated) DEVICE — SYR 50ML SLIP TIP W/O NDL 309654

## (undated) DEVICE — DAVINCI XI REDUCER 8-12MM 470381

## (undated) DEVICE — DAVINCI XI HANDPIECE ESU VESSEL SEALER 8MM EXT 480422

## (undated) DEVICE — SYR 20ML LL W/O NDL 302830

## (undated) DEVICE — TUBING SUCTION MEDI-VAC 1/4"X20' N620A - HE

## (undated) DEVICE — SUTURE VICRYL+ 2-0 27IN SH UND VCP417H

## (undated) DEVICE — DRAPE SHEET TABLE COVER KC 42301*

## (undated) DEVICE — DRSG STERI STRIP 1/4X3" R1541

## (undated) DEVICE — DECANTER BAG 2002S

## (undated) DEVICE — MARKER SURG SKIN STRL 77734

## (undated) DEVICE — TUBING SMOKE EVAC PNEUMOCLEAR HIGH FLOW 0620050250

## (undated) DEVICE — DAVINCI SEAL CANNULA AND STPL 12MM 470380

## (undated) DEVICE — LUBRICANT INST ELECTROLUBE EL101

## (undated) DEVICE — DEVICE CLOSURE V-LOC 0 GS-21 6IN VLOCN0306

## (undated) DEVICE — DRAPE U SPLIT 74X120" 29440

## (undated) DEVICE — PAD POS XL 1X20X40IN PINK PIGAZZI

## (undated) DEVICE — CUSTOM PACK LAP CHOLE SBA5BLCHEA

## (undated) DEVICE — DAVINCI XI DRAPE COLUMN 470341

## (undated) DEVICE — NDL INSUFFLATION 13GA 120MM C2201

## (undated) DEVICE — DAVINCI XI OBTURATOR BLADELESS 8MM 470359

## (undated) DEVICE — BLADE KNIFE SURG 11 371111

## (undated) DEVICE — SUCTION MANIFOLD NEPTUNE 2 SYS 1 PORT 702-025-000

## (undated) DEVICE — DAVINCI XI SEAL UNIVERSAL 5-8MM 470361

## (undated) DEVICE — TUBE PENROSE 3/8 X 12 STRL LTX 0912020

## (undated) DEVICE — SU VICRYL+ 0 27 UR6 VLT VCP603H

## (undated) DEVICE — SU ETHIBOND 0 CT-2 30" X412H

## (undated) DEVICE — DRESSING COVERLET STRIP 3/4 X 3 LF 230

## (undated) DEVICE — DRAPE SHEET REV FOLD 3/4 9349

## (undated) DEVICE — DRSG TEGADERM 2 3/8X2 3/4" 1624W

## (undated) DEVICE — NDL SPINAL 20GA 3.5"

## (undated) DEVICE — SOL NACL 0.9% 100ML BAG 2B1302

## (undated) DEVICE — SUTURE MONOCRYL+ 4-0 PS-2 27IN MCP426H

## (undated) DEVICE — DAVINCI XI DRAPE ARM 470015

## (undated) DEVICE — FORCEP BIOPSY DISP 000386

## (undated) DEVICE — PREP CHLORAPREP 26ML TINTED HI-LITE ORANGE 930815

## (undated) RX ORDER — LIDOCAINE HYDROCHLORIDE 10 MG/ML
INJECTION, SOLUTION EPIDURAL; INFILTRATION; INTRACAUDAL; PERINEURAL
Status: DISPENSED
Start: 2023-04-10

## (undated) RX ORDER — ONDANSETRON 2 MG/ML
INJECTION INTRAMUSCULAR; INTRAVENOUS
Status: DISPENSED
Start: 2023-04-10

## (undated) RX ORDER — PROPOFOL 10 MG/ML
INJECTION, EMULSION INTRAVENOUS
Status: DISPENSED
Start: 2023-04-10

## (undated) RX ORDER — DEXAMETHASONE SODIUM PHOSPHATE 10 MG/ML
INJECTION, SOLUTION INTRAMUSCULAR; INTRAVENOUS
Status: DISPENSED
Start: 2023-04-10

## (undated) RX ORDER — INDOCYANINE GREEN AND WATER 25 MG
KIT INJECTION
Status: DISPENSED
Start: 2023-04-10

## (undated) RX ORDER — GLYCOPYRROLATE 0.2 MG/ML
INJECTION, SOLUTION INTRAMUSCULAR; INTRAVENOUS
Status: DISPENSED
Start: 2023-04-10

## (undated) RX ORDER — FENTANYL CITRATE 50 UG/ML
INJECTION, SOLUTION INTRAMUSCULAR; INTRAVENOUS
Status: DISPENSED
Start: 2023-04-10